# Patient Record
Sex: FEMALE | Race: WHITE | NOT HISPANIC OR LATINO | Employment: FULL TIME | URBAN - METROPOLITAN AREA
[De-identification: names, ages, dates, MRNs, and addresses within clinical notes are randomized per-mention and may not be internally consistent; named-entity substitution may affect disease eponyms.]

---

## 2017-03-20 ENCOUNTER — OFFICE VISIT - HEALTHEAST (OUTPATIENT)
Dept: FAMILY MEDICINE | Facility: CLINIC | Age: 37
End: 2017-03-20

## 2017-03-20 DIAGNOSIS — H10.9 CONJUNCTIVITIS: ICD-10-CM

## 2017-05-15 ENCOUNTER — COMMUNICATION - HEALTHEAST (OUTPATIENT)
Dept: SCHEDULING | Facility: CLINIC | Age: 37
End: 2017-05-15

## 2017-05-15 ENCOUNTER — OFFICE VISIT - HEALTHEAST (OUTPATIENT)
Dept: FAMILY MEDICINE | Facility: CLINIC | Age: 37
End: 2017-05-15

## 2017-05-15 DIAGNOSIS — M54.9 BACK PAIN: ICD-10-CM

## 2017-08-28 ENCOUNTER — OFFICE VISIT - HEALTHEAST (OUTPATIENT)
Dept: FAMILY MEDICINE | Facility: CLINIC | Age: 37
End: 2017-08-28

## 2017-08-28 ENCOUNTER — COMMUNICATION - HEALTHEAST (OUTPATIENT)
Dept: TELEHEALTH | Facility: CLINIC | Age: 37
End: 2017-08-28

## 2017-08-28 DIAGNOSIS — R07.0 THROAT DISCOMFORT: ICD-10-CM

## 2017-08-28 DIAGNOSIS — R53.83 FATIGUE: ICD-10-CM

## 2017-08-29 ENCOUNTER — HOSPITAL ENCOUNTER (OUTPATIENT)
Dept: ULTRASOUND IMAGING | Facility: CLINIC | Age: 37
Discharge: HOME | End: 2017-08-29
Attending: FAMILY MEDICINE

## 2017-08-29 DIAGNOSIS — R07.0 THROAT DISCOMFORT: ICD-10-CM

## 2017-08-30 ENCOUNTER — COMMUNICATION - HEALTHEAST (OUTPATIENT)
Dept: FAMILY MEDICINE | Facility: CLINIC | Age: 37
End: 2017-08-30

## 2017-09-13 ENCOUNTER — COMMUNICATION - HEALTHEAST (OUTPATIENT)
Dept: FAMILY MEDICINE | Facility: CLINIC | Age: 37
End: 2017-09-13

## 2017-09-13 DIAGNOSIS — R07.0 THROAT DISCOMFORT: ICD-10-CM

## 2017-09-13 DIAGNOSIS — R49.0 HOARSENESS: ICD-10-CM

## 2017-09-19 ENCOUNTER — COMMUNICATION - HEALTHEAST (OUTPATIENT)
Dept: FAMILY MEDICINE | Facility: CLINIC | Age: 37
End: 2017-09-19

## 2017-10-12 ENCOUNTER — OFFICE VISIT - HEALTHEAST (OUTPATIENT)
Dept: FAMILY MEDICINE | Facility: CLINIC | Age: 37
End: 2017-10-12

## 2017-10-12 ENCOUNTER — OFFICE VISIT - HEALTHEAST (OUTPATIENT)
Dept: OTOLARYNGOLOGY | Facility: CLINIC | Age: 37
End: 2017-10-12

## 2017-10-12 DIAGNOSIS — Z12.4 CERVICAL CANCER SCREENING: ICD-10-CM

## 2017-10-12 DIAGNOSIS — F39 MOOD DISORDER (H): ICD-10-CM

## 2017-10-12 DIAGNOSIS — F41.9 ANXIETY: ICD-10-CM

## 2017-10-12 DIAGNOSIS — E04.1 THYROID NODULE: ICD-10-CM

## 2017-10-12 DIAGNOSIS — Z00.00 ROUTINE GENERAL MEDICAL EXAMINATION AT A HEALTH CARE FACILITY: ICD-10-CM

## 2017-10-12 DIAGNOSIS — Z13.220 SCREENING CHOLESTEROL LEVEL: ICD-10-CM

## 2017-10-12 DIAGNOSIS — K21.9 LARYNGOPHARYNGEAL REFLUX (LPR): ICD-10-CM

## 2017-10-12 DIAGNOSIS — Z13.1 DIABETES MELLITUS SCREENING: ICD-10-CM

## 2017-10-12 DIAGNOSIS — J37.0 CHRONIC LARYNGITIS: ICD-10-CM

## 2017-10-12 LAB
CHOLEST SERPL-MCNC: 198 MG/DL
FASTING STATUS PATIENT QL REPORTED: YES
HDLC SERPL-MCNC: 52 MG/DL
LDLC SERPL CALC-MCNC: 122 MG/DL
TRIGL SERPL-MCNC: 121 MG/DL

## 2017-10-12 ASSESSMENT — MIFFLIN-ST. JEOR: SCORE: 1244.3

## 2017-10-17 LAB
BKR LAB AP ABNORMAL BLEEDING: NO
BKR LAB AP BIRTH CONTROL/HORMONES: NORMAL
BKR LAB AP CERVICAL APPEARANCE: NORMAL
BKR LAB AP GYN ADEQUACY: NORMAL
BKR LAB AP GYN INTERPRETATION: NORMAL
BKR LAB AP HPV REFLEX: NORMAL
BKR LAB AP LMP: NORMAL
BKR LAB AP PATIENT STATUS: NORMAL
BKR LAB AP PREVIOUS ABNORMAL: NORMAL
BKR LAB AP PREVIOUS NORMAL: 2012
HIGH RISK?: NO
HPV INTERPRETATION - HISTORICAL: NORMAL
HPV INTERPRETER - HISTORICAL: NORMAL
PATH REPORT.COMMENTS IMP SPEC: NORMAL
RESULT FLAG (HE HISTORICAL CONVERSION): NORMAL

## 2017-10-18 ENCOUNTER — COMMUNICATION - HEALTHEAST (OUTPATIENT)
Dept: FAMILY MEDICINE | Facility: CLINIC | Age: 37
End: 2017-10-18

## 2017-11-13 ENCOUNTER — OFFICE VISIT - HEALTHEAST (OUTPATIENT)
Dept: FAMILY MEDICINE | Facility: CLINIC | Age: 37
End: 2017-11-13

## 2017-11-13 DIAGNOSIS — F41.9 ANXIETY: ICD-10-CM

## 2018-01-16 ENCOUNTER — COMMUNICATION - HEALTHEAST (OUTPATIENT)
Dept: ADMINISTRATIVE | Facility: CLINIC | Age: 38
End: 2018-01-16

## 2018-09-25 ENCOUNTER — RECORDS - HEALTHEAST (OUTPATIENT)
Dept: ADMINISTRATIVE | Facility: OTHER | Age: 38
End: 2018-09-25

## 2018-10-30 ENCOUNTER — OFFICE VISIT - HEALTHEAST (OUTPATIENT)
Dept: FAMILY MEDICINE | Facility: CLINIC | Age: 38
End: 2018-10-30

## 2018-10-30 DIAGNOSIS — F41.9 ANXIETY: ICD-10-CM

## 2018-10-30 DIAGNOSIS — Z00.00 ROUTINE GENERAL MEDICAL EXAMINATION AT A HEALTH CARE FACILITY: ICD-10-CM

## 2018-10-30 DIAGNOSIS — R35.0 INCREASED FREQUENCY OF URINATION: ICD-10-CM

## 2018-10-30 DIAGNOSIS — E04.1 THYROID NODULE: ICD-10-CM

## 2018-10-30 LAB
ALBUMIN UR-MCNC: NEGATIVE MG/DL
APPEARANCE UR: CLEAR
BACTERIA #/AREA URNS HPF: ABNORMAL HPF
BILIRUB UR QL STRIP: NEGATIVE
COLOR UR AUTO: YELLOW
GLUCOSE UR STRIP-MCNC: NEGATIVE MG/DL
HGB UR QL STRIP: ABNORMAL
KETONES UR STRIP-MCNC: NEGATIVE MG/DL
LEUKOCYTE ESTERASE UR QL STRIP: NEGATIVE
NITRATE UR QL: NEGATIVE
PH UR STRIP: 7 [PH] (ref 5–8)
RBC #/AREA URNS AUTO: ABNORMAL HPF
SP GR UR STRIP: 1.01 (ref 1–1.03)
SQUAMOUS #/AREA URNS AUTO: ABNORMAL LPF
TSH SERPL DL<=0.005 MIU/L-ACNC: 1.8 UIU/ML (ref 0.3–5)
UROBILINOGEN UR STRIP-ACNC: ABNORMAL
WBC #/AREA URNS AUTO: ABNORMAL HPF

## 2018-10-30 ASSESSMENT — MIFFLIN-ST. JEOR: SCORE: 1238.34

## 2018-10-31 LAB — BACTERIA SPEC CULT: NO GROWTH

## 2018-11-01 ENCOUNTER — COMMUNICATION - HEALTHEAST (OUTPATIENT)
Dept: FAMILY MEDICINE | Facility: CLINIC | Age: 38
End: 2018-11-01

## 2018-11-12 ENCOUNTER — HOSPITAL ENCOUNTER (OUTPATIENT)
Dept: ULTRASOUND IMAGING | Facility: CLINIC | Age: 38
Discharge: HOME | End: 2018-11-12
Attending: FAMILY MEDICINE

## 2018-11-12 DIAGNOSIS — E04.1 THYROID NODULE: ICD-10-CM

## 2018-11-15 ENCOUNTER — COMMUNICATION - HEALTHEAST (OUTPATIENT)
Dept: FAMILY MEDICINE | Facility: CLINIC | Age: 38
End: 2018-11-15

## 2018-11-26 ENCOUNTER — COMMUNICATION - HEALTHEAST (OUTPATIENT)
Dept: FAMILY MEDICINE | Facility: CLINIC | Age: 38
End: 2018-11-26

## 2018-11-26 ENCOUNTER — AMBULATORY - HEALTHEAST (OUTPATIENT)
Dept: FAMILY MEDICINE | Facility: CLINIC | Age: 38
End: 2018-11-26

## 2018-11-26 DIAGNOSIS — L98.9 SKIN LESION: ICD-10-CM

## 2018-11-26 DIAGNOSIS — F41.9 ANXIETY: ICD-10-CM

## 2018-11-26 DIAGNOSIS — E04.1 THYROID NODULE: ICD-10-CM

## 2018-11-29 ENCOUNTER — AMBULATORY - HEALTHEAST (OUTPATIENT)
Dept: FAMILY MEDICINE | Facility: CLINIC | Age: 38
End: 2018-11-29

## 2018-11-29 DIAGNOSIS — E04.1 THYROID NODULE: ICD-10-CM

## 2018-12-03 ENCOUNTER — HOSPITAL ENCOUNTER (OUTPATIENT)
Dept: ULTRASOUND IMAGING | Facility: CLINIC | Age: 38
Discharge: HOME | End: 2018-12-03
Attending: FAMILY MEDICINE | Admitting: RADIOLOGY

## 2018-12-03 DIAGNOSIS — E04.1 THYROID NODULE: ICD-10-CM

## 2018-12-05 LAB
LAB AP CHARGES (HE HISTORICAL CONVERSION): NORMAL
LAB AP INITIAL CYTO EVAL (HE HISTORICAL CONVERSION): NORMAL
LAB MED GENERAL PATH INTERP (HE HISTORICAL CONVERSION): NORMAL
PATH REPORT.COMMENTS IMP SPEC: NORMAL
PATH REPORT.COMMENTS IMP SPEC: NORMAL
PATH REPORT.FINAL DX SPEC: NORMAL
PATH REPORT.MICROSCOPIC SPEC OTHER STN: NORMAL
PATH REPORT.RELEVANT HX SPEC: NORMAL
RESULT FLAG (HE HISTORICAL CONVERSION): NORMAL
SPECIMEN DESCRIPTION: NORMAL

## 2018-12-06 ENCOUNTER — COMMUNICATION - HEALTHEAST (OUTPATIENT)
Dept: FAMILY MEDICINE | Facility: CLINIC | Age: 38
End: 2018-12-06

## 2019-02-12 ENCOUNTER — RECORDS - HEALTHEAST (OUTPATIENT)
Dept: ADMINISTRATIVE | Facility: OTHER | Age: 39
End: 2019-02-12

## 2019-02-25 ENCOUNTER — COMMUNICATION - HEALTHEAST (OUTPATIENT)
Dept: SCHEDULING | Facility: CLINIC | Age: 39
End: 2019-02-25

## 2019-02-25 ENCOUNTER — COMMUNICATION - HEALTHEAST (OUTPATIENT)
Dept: TELEHEALTH | Facility: CLINIC | Age: 39
End: 2019-02-25

## 2019-02-25 ENCOUNTER — OFFICE VISIT - HEALTHEAST (OUTPATIENT)
Dept: FAMILY MEDICINE | Facility: CLINIC | Age: 39
End: 2019-02-25

## 2019-02-25 DIAGNOSIS — R35.0 URINARY FREQUENCY: ICD-10-CM

## 2019-02-25 DIAGNOSIS — J06.9 VIRAL UPPER RESPIRATORY TRACT INFECTION: ICD-10-CM

## 2019-02-25 LAB
ALBUMIN UR-MCNC: NEGATIVE MG/DL
APPEARANCE UR: CLEAR
BACTERIA #/AREA URNS HPF: ABNORMAL HPF
BILIRUB UR QL STRIP: NEGATIVE
COLOR UR AUTO: YELLOW
GLUCOSE UR STRIP-MCNC: NEGATIVE MG/DL
HGB UR QL STRIP: ABNORMAL
KETONES UR STRIP-MCNC: NEGATIVE MG/DL
LEUKOCYTE ESTERASE UR QL STRIP: NEGATIVE
NITRATE UR QL: NEGATIVE
PH UR STRIP: 6 [PH] (ref 5–8)
RBC #/AREA URNS AUTO: ABNORMAL HPF
SP GR UR STRIP: 1.02 (ref 1–1.03)
SQUAMOUS #/AREA URNS AUTO: ABNORMAL LPF
UROBILINOGEN UR STRIP-ACNC: ABNORMAL
WBC #/AREA URNS AUTO: ABNORMAL HPF

## 2019-02-27 ENCOUNTER — COMMUNICATION - HEALTHEAST (OUTPATIENT)
Dept: SCHEDULING | Facility: CLINIC | Age: 39
End: 2019-02-27

## 2019-02-28 ENCOUNTER — AMBULATORY - HEALTHEAST (OUTPATIENT)
Dept: FAMILY MEDICINE | Facility: CLINIC | Age: 39
End: 2019-02-28

## 2019-02-28 DIAGNOSIS — N39.0 URINARY TRACT INFECTION WITHOUT HEMATURIA, SITE UNSPECIFIED: ICD-10-CM

## 2019-02-28 LAB — BACTERIA SPEC CULT: ABNORMAL

## 2019-03-18 ENCOUNTER — OFFICE VISIT - HEALTHEAST (OUTPATIENT)
Dept: FAMILY MEDICINE | Facility: CLINIC | Age: 39
End: 2019-03-18

## 2019-03-18 DIAGNOSIS — R35.0 URINARY FREQUENCY: ICD-10-CM

## 2019-03-18 DIAGNOSIS — R09.82 POST-NASAL DRIP: ICD-10-CM

## 2019-03-18 LAB
ALBUMIN UR-MCNC: NEGATIVE MG/DL
APPEARANCE UR: CLEAR
BILIRUB UR QL STRIP: NEGATIVE
COLOR UR AUTO: YELLOW
GLUCOSE UR STRIP-MCNC: NEGATIVE MG/DL
HGB UR QL STRIP: NEGATIVE
KETONES UR STRIP-MCNC: NEGATIVE MG/DL
LEUKOCYTE ESTERASE UR QL STRIP: NEGATIVE
NITRATE UR QL: NEGATIVE
PH UR STRIP: 6.5 [PH] (ref 5–8)
SP GR UR STRIP: 1.01 (ref 1–1.03)
UROBILINOGEN UR STRIP-ACNC: NORMAL

## 2019-03-19 LAB — BACTERIA SPEC CULT: NO GROWTH

## 2019-05-14 ENCOUNTER — COMMUNICATION - HEALTHEAST (OUTPATIENT)
Dept: FAMILY MEDICINE | Facility: CLINIC | Age: 39
End: 2019-05-14

## 2019-05-16 ENCOUNTER — OFFICE VISIT - HEALTHEAST (OUTPATIENT)
Dept: FAMILY MEDICINE | Facility: CLINIC | Age: 39
End: 2019-05-16

## 2019-05-16 DIAGNOSIS — R35.0 INCREASED FREQUENCY OF URINATION: ICD-10-CM

## 2019-05-16 LAB
ALBUMIN UR-MCNC: NEGATIVE MG/DL
APPEARANCE UR: CLEAR
BACTERIA #/AREA URNS HPF: ABNORMAL HPF
BILIRUB UR QL STRIP: NEGATIVE
COLOR UR AUTO: YELLOW
GLUCOSE UR STRIP-MCNC: NEGATIVE MG/DL
HGB UR QL STRIP: ABNORMAL
KETONES UR STRIP-MCNC: ABNORMAL MG/DL
LEUKOCYTE ESTERASE UR QL STRIP: NEGATIVE
NITRATE UR QL: NEGATIVE
PH UR STRIP: 7 [PH] (ref 5–8)
RBC #/AREA URNS AUTO: ABNORMAL HPF
SP GR UR STRIP: 1.02 (ref 1–1.03)
SQUAMOUS #/AREA URNS AUTO: ABNORMAL LPF
UROBILINOGEN UR STRIP-ACNC: ABNORMAL
WBC #/AREA URNS AUTO: ABNORMAL HPF

## 2019-05-17 LAB — BACTERIA SPEC CULT: NO GROWTH

## 2019-07-01 ENCOUNTER — RECORDS - HEALTHEAST (OUTPATIENT)
Dept: ADMINISTRATIVE | Facility: OTHER | Age: 39
End: 2019-07-01

## 2019-11-18 ENCOUNTER — OFFICE VISIT - HEALTHEAST (OUTPATIENT)
Dept: FAMILY MEDICINE | Facility: CLINIC | Age: 39
End: 2019-11-18

## 2019-11-18 ENCOUNTER — COMMUNICATION - HEALTHEAST (OUTPATIENT)
Dept: TELEHEALTH | Facility: CLINIC | Age: 39
End: 2019-11-18

## 2019-11-18 DIAGNOSIS — R35.0 URINARY FREQUENCY: ICD-10-CM

## 2019-11-18 LAB
ALBUMIN UR-MCNC: NEGATIVE MG/DL
APPEARANCE UR: CLEAR
BACTERIA #/AREA URNS HPF: ABNORMAL HPF
BILIRUB UR QL STRIP: NEGATIVE
COLOR UR AUTO: YELLOW
GLUCOSE UR STRIP-MCNC: NEGATIVE MG/DL
HGB UR QL STRIP: ABNORMAL
KETONES UR STRIP-MCNC: NEGATIVE MG/DL
LEUKOCYTE ESTERASE UR QL STRIP: NEGATIVE
NITRATE UR QL: NEGATIVE
PH UR STRIP: 7 [PH] (ref 5–8)
RBC #/AREA URNS AUTO: ABNORMAL HPF
SP GR UR STRIP: 1.01 (ref 1–1.03)
SQUAMOUS #/AREA URNS AUTO: ABNORMAL LPF
UROBILINOGEN UR STRIP-ACNC: ABNORMAL
WBC #/AREA URNS AUTO: ABNORMAL HPF

## 2019-11-18 ASSESSMENT — MIFFLIN-ST. JEOR: SCORE: 1254.22

## 2019-11-19 LAB — BACTERIA SPEC CULT: NO GROWTH

## 2019-12-09 ENCOUNTER — OFFICE VISIT - HEALTHEAST (OUTPATIENT)
Dept: FAMILY MEDICINE | Facility: CLINIC | Age: 39
End: 2019-12-09

## 2019-12-09 DIAGNOSIS — Z00.00 ROUTINE GENERAL MEDICAL EXAMINATION AT A HEALTH CARE FACILITY: ICD-10-CM

## 2019-12-09 DIAGNOSIS — G47.00 INSOMNIA, UNSPECIFIED TYPE: ICD-10-CM

## 2019-12-09 DIAGNOSIS — F41.9 ANXIETY: ICD-10-CM

## 2019-12-09 LAB
CHOLEST SERPL-MCNC: 155 MG/DL
FASTING STATUS PATIENT QL REPORTED: YES
FASTING STATUS PATIENT QL REPORTED: YES
GLUCOSE BLD-MCNC: 84 MG/DL (ref 70–99)
HDLC SERPL-MCNC: 46 MG/DL
LDLC SERPL CALC-MCNC: 100 MG/DL
TRIGL SERPL-MCNC: 46 MG/DL
TSH SERPL DL<=0.005 MIU/L-ACNC: 1.5 UIU/ML (ref 0.3–5)

## 2019-12-09 RX ORDER — HYDROXYZINE HYDROCHLORIDE 50 MG/1
TABLET, FILM COATED ORAL
Qty: 90 TABLET | Refills: 3 | Status: SHIPPED | OUTPATIENT
Start: 2019-12-09 | End: 2022-08-08

## 2019-12-09 ASSESSMENT — MIFFLIN-ST. JEOR: SCORE: 1235.22

## 2020-03-15 ENCOUNTER — VIRTUAL VISIT (OUTPATIENT)
Dept: FAMILY MEDICINE | Facility: OTHER | Age: 40
End: 2020-03-15

## 2020-03-15 ENCOUNTER — OFFICE VISIT - HEALTHEAST (OUTPATIENT)
Dept: FAMILY MEDICINE | Facility: CLINIC | Age: 40
End: 2020-03-15

## 2020-03-15 DIAGNOSIS — R05.9 COUGH: ICD-10-CM

## 2020-03-15 NOTE — PROGRESS NOTES
"Date: 03/15/2020 11:27:00  Clinician: Norma Balir  Clinician NPI: [id number]  Patient: Viv Rubio  Patient : 1980  Patient Address: [facility] [place] [address]  Patient Phone: [phone]  Visit Protocol: URI  Patient Summary:  Viv is a 39 year old ( : 1980 ) female who initiated a Visit for COVID-19 (Coronavirus) evaluation and screening. When asked the question \"Please sign me up to receive news, health information and promotions. \", Viv responded \"No\".    Viv states her symptoms started today.   Her symptoms consist of malaise, a headache, enlarged lymph nodes, myalgia, and a cough. She is experiencing mild difficulty breathing with activities but can speak normally in full sentences.   Symptom details     Cough: Viv coughs a few times an hour and her cough is not more bothersome at night. Phlegm comes into her throat when she coughs. She does not believe her cough is caused by post-nasal drip. The color of the phlegm is clear.     Headache: She states the headache is mild (1-3 on a 10 point pain scale).      Viv denies having ear pain, fever, rhinitis, facial pain or pressure, chills, wheezing, sore throat, nasal congestion, and teeth pain. She also denies having recent facial or sinus surgery in the past 60 days and taking antibiotic medication for the symptoms.   Precipitating events  She has not recently been exposed to someone with influenza. Viv has not been in close contact with any high risk individuals.   Pertinent COVID-19 (Coronavirus) information  Viv has not traveled internationally or to the areas where COVID-19 (Coronavirus) is widespread in the last 14 days before the start of her symptoms.   Viv has not had close contact with a suspected or laboratory-confirmed COVID-19 patient within 14 days of symptom onset.   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   Pertinent medical history  Viv does not get yeast " infections when she takes antibiotics.   Viv does not need a return to work/school note.   Weight: 130 lbs   Viv does not smoke or use smokeless tobacco.   She denies pregnancy and denies breastfeeding. She does not menstruate.   Additional information as reported by the patient (free text): Was in [place] for past week, was at [employer] etc, flew home yesterday,  experiencing similar symptoms but also has sore throat.  Just trying to determine if I can return to work on Tuesday as planned or if I need to quarantine   Weight: 130 lbs    MEDICATIONS: hydroxyzine HCl oral, ALLERGIES: NKDA  Clinician Response:  Dear Viv,   Dear Viv Rubio,  Based on the information you have provided, it is recommended that you go to one of our designated Corona Virus 19 testing centers to get a test done from your car. To do this follow these instructions:  You should go to one of our dedicated testing centers as soon as possible during the hours below at one of these locations:   Walk-in Care: [facility] at [address] [address]. Hours: M-F 7am - 6pm, Sat-Sun 8am -- 3pm   [facility] at [address] [address]. Hours: Every Day 9am -- 7pm  Walk-in Care: [facility] at [address] [place] [address]. Hours: M-F 7am - 6pm, Sat-Sun 8am -- 3pm  M [address] [address]. Hours: M-F 11am -- 7pm, Sat-Sun 9am-4pm   What to expect:   When you arrive please come park in the parking lot.  Call [phone] and let them know which of the four clinics you are at, description of your car and where you are parked. Mention you did an [facility] visit and were sent for testing.  They will add you to the queue to get your test (you will stay in your car the entire time).  On that phone call you will give them the information to register your for the visit.  You will then be met by a provider  who will perform a brief assessment in your car and collect samples to send for Corona Virus 19, influenza and possibly RSV.  You will be given patient information about respiratory illnesses and instructions. You with the results if you are not on mychart.   Isolate Yourself:   Isolate yourself while traveling.  Do Not allow any visitors within 6 feet.  Do Not go to work or school.  Do Not go to [employer],  centers, shopping, or other public places.  Do Not shake hands.  Avoid close contact with others (hugging, kissing).  Protect Others:  Cover Your Mouth and Nose with a mask, disposable tissue or wash cloth to avoid spreading germs to others.  Wash your hands and face frequently with soap and water   Fever Medicines:   For fever relief, take acetaminophen or ibuprofen.  Treat fevers above 101deg F (38.3deg C) to lower fevers and make you more comfortable.   Acetaminophen (e.g., Tylenol): Take 650 mg (two 325 mg pills) by mouth every 4-6 hours as needed of regular strength Tylenol or 1,000 mg (two 500 mg pills) every 8 hours as needed of Extra Strength Tylenol.   Ibuprofen (e.g., Motrin, Advil): Take 400 mg (two 200 mg pills) by mouth every 6 hours as needed.   Acetaminophen is thought to be safer than ibuprofen or naproxen for people over 65 years old. Acetaminophen is in many OTC and prescription medicines. It might be in more than one medicine that you are taking. You need to be careful and not take an overdose. Before taking any medicine, read all the instructions on the package.  Caution -NSAIDs (e.g., ibuprofen, naproxen): Do not take nonsteroidal anti-inflammatory drugs (NSAIDs) if you have stomach problems, kidney disease, heart failure, or other contraindications to using this type of medicine. Do not take NSAID medicines for over 7 days without consulting your PCP. Do not take NSAID medicines if you are pregnant. Do not take NSAID medicines if you are also taking blood thinners.   Call Back If:  Breathing difficulty develops or you become worse.  Thank you for limiting contact with others, wearing a simple mask to cover your cough, practice good hand hygiene habits and accessing our virtual services where possible to limit the spread of this virus.  For more information about COVID19 and options for caring for yourself at home, please visit the CDC website at https://www.cdc.gov/coronavirus/2019-ncov/about/steps-when-sick.html  For more options for care at [facility], please visit our website at [email or web address]    Diagnosis: Cough  Diagnosis ICD: R05

## 2020-03-19 ENCOUNTER — NURSE TRIAGE (OUTPATIENT)
Dept: NURSING | Facility: CLINIC | Age: 40
End: 2020-03-19

## 2020-03-19 NOTE — TELEPHONE ENCOUNTER
Was tested 3-15-20 for Covid19 test and asking if they are still running it?  Informed patient that they are still running the tests that were swabbed but may take a little longer, and will call her with results.  She should continue her quarantine until test results and symptoms are gone.    Instructions for Patients    Thank you for limiting contact with others, wearing a simple mask to cover your cough, practice good hand hygiene habits and accessing our virtual services where possible to limit the spread of this virus.    For more information about COVID19 and options for caring for yourself at home, please visit the CDC website at https://www.cdc.gov/coronavirus/2019-ncov/about/steps-when-sick.html  For more options for care at [facility], please visit our website at [email or web address]       Cat Lock RN  [facility]         Reason for Disposition    Health Information question, no triage required and triager able to answer question    Protocols used: INFORMATION ONLY CALL-A-AH

## 2020-03-20 ENCOUNTER — COMMUNICATION - HEALTHEAST (OUTPATIENT)
Dept: FAMILY MEDICINE | Facility: CLINIC | Age: 40
End: 2020-03-20

## 2020-03-20 LAB
COVID-19 VIRUS PCR RESULT FROM MDH: NEGATIVE
SPECIMEN STATUS: NORMAL

## 2020-03-21 ENCOUNTER — COMMUNICATION - HEALTHEAST (OUTPATIENT)
Dept: FAMILY MEDICINE | Facility: CLINIC | Age: 40
End: 2020-03-21

## 2020-05-11 ENCOUNTER — VIRTUAL VISIT (OUTPATIENT)
Dept: FAMILY MEDICINE | Facility: OTHER | Age: 40
End: 2020-05-11

## 2020-05-11 ENCOUNTER — COMMUNICATION - HEALTHEAST (OUTPATIENT)
Dept: SCHEDULING | Facility: CLINIC | Age: 40
End: 2020-05-11

## 2020-05-11 NOTE — PROGRESS NOTES
"Date: 2020 07:14:42  Clinician: Sandra Azar  Clinician NPI: [id number]  Patient: Viv Rubio  Patient : 1980  Patient Address: [address] [place] [address]  Patient Phone: [phone]  Visit Protocol: UTI  Patient Summary:  Viv is a 39 year old ( : 1980 ) female who initiated a Visit for a presumed bladder infection. When asked the question \"Please sign me up to receive news, health information and promotions. \", Viv responded \"No\".   Her symptoms started 1-3 days ago and consist of dysuria, foul-smelling urine, nausea, urinary frequency, chills, vaginal discharge, and urgency. Viv also feels feverish but was unable to measure her temperature.   Symptom details     Urine color: The color of her urine is yellow.     Vaginal discharge: The discharge is white in color and is smooth. The discharge is typical for her.      Denied symptoms include feeling as if the bladder is never empty, flank pain, abdominal pain, urinary incontinence, vomiting, and vaginal itching.   Over-the-counter medications or home remedies used to relieve the current symptoms as reported by the patient (free text): Tylenol   Precipitating events  Viv denies having a sexually transmitted disease.  Pertinent medical history  Viv has had a bladder infection before but has not had any in the past 12 months. Her current symptoms are similar to her previous bladder infection symptoms.   She is not sure what antibiotics have been effective in treating her past bladder infections.   She has a history of kidney stones. Her last episode was more than 6 months ago.   Viv has not been prescribed antibiotics to prevent frequent or repeated bladder infections in the past and does not get yeast infections when she takes antibiotics. She has not experienced problems or side effects with any of the common antibiotics used to treat bladder infections.   She has not used a catheter or been a patient in a " hospital or nursing home in the past 2 weeks.   Viv does not smoke or use smokeless tobacco.   She denies pregnancy and denies breastfeeding. She does not menstruate.   Additional information as reported by the patient (free text): Slight leaking,discharge,odor for past e weeks.  Around 2:30am this morning intense urge,nausea,chills began, generally achy and headache.  Able to empty bladder with pain, cloudy,smiley, pink tinge when wiped.  Attempted to lay back down but constantly had intense I urge to go but only a few drops would come out.  Took Tylenol around 4am which helped a little and was able to fall back asleep for another hour.  Spoke to call line nurse who advised to do this.     MEDICATIONS: hydroxyzine HCl oral, ALLERGIES: NKDA  Clinician Response:  Dear Viv,  Based on the information you have provided, you likely have an acute urinary tract infection, also called a bladder infection. Bladder infections occur when bacteria from the outside of the body enters the urinary tract. Any part of the urinary system can be infected, but the bladder is the most common.  Medication information  I am prescribing:     Sulfamethoxazole-trimethoprim (Bactrim DS) 800-160 mg oral tablet. Take 1 tablet by mouth every 12 hours for 3 days. There are no refills with this prescription.   Certain antibiotics such as Bactrim and Ciprofloxacin can cause your skin to be more sensitive to the sun. Exposure to the sun when taking these antibiotics may cause skin rash, itching, redness, or a severe sunburn. Be sure to avoid prolonged or direct exposure to the sun, especially between 10 am and 3 pm, if possible. Wear protective clothing and use sunscreen of SPF 30 or higher when you are outdoors.  The medication I prescribed for your bladder infection is an antibiotic. Continue taking the medication until it is gone even if you feel better. If you get an upset stomach while taking antibiotics, taking the medication with food  can help.   Yeast infections can be a common side effect of antibiotics. The most common symptom of a yeast infection is itchiness in and around the vagina. Other signs and symptoms include burning, redness, or a thick, white vaginal discharge that looks like cottage cheese and does not have a bad smell.  Unless you are allergic to the following over-the-counter medication, I recommend:     Phenazopyridine (AZO, Uristat, or store brand) oral tablet to treat your discomfort with urination. Swallow 2 tablets 3 times a day for up to 2 days. Take the tablets with a full glass of water after a meal.   This medication helps to relieve symptoms caused by irritation of urinary tract such as pain, burning, and the sudden urge to urinate, but will not cure a bladder infection. The color of your urine will likely turn an orange color and can permanently stain clothing it comes into contact with.  Soft contact lenses can also be permanently stained and should not be worn while taking this medication. If you must wear your contacts, wash your hands after handling the medication.  Stop using this medication immediately and be seen in a clinic or urgent care if your skin or the whites of your eyes appear yellowish in color.  Over-the-counter medications do not require a prescription. Ask the pharmacist if you have any questions.  Self care  Urination helps to flush bacteria from the urinary tract. For this reason, drinking water and urinating often helps relieve some urinary symptoms and can decrease your risk of getting bladder infections in the future.  Other steps you can take to prevent future bladder infections include:     Wipe front to back after using the bathroom    Urinate after sexual intercourse    Avoid using deodorant sprays, douches, or powders in the vaginal area     When to seek care  Please make an appointment to be seen in a clinic or urgent care if any of the following occur:     You develop new symptoms or  your symptoms become worse    You have medication side effects that make it difficult to take them as prescribed    Your symptoms do not improve within 1-2 days of starting treatment    You have symptoms of a bladder infection that return shortly after completing treatment     It is possible to have an allergic reaction to an antibiotic even if you have not had one in the past. If you notice a new rash, significant swelling, or difficulty breathing, stop taking this medication immediately and go to a clinic or urgent care.   Diagnosis: Acute uncomplicated bladder infection  Diagnosis ICD: N39.0  Prescription: sulfamethoxazole-trimethoprim (Bactrim DS) 800-160 mg oral tablet 6 tablet, 3 days supply. Take 1 tablet by mouth every 12 hours for 3 days. Refills: 0, Refill as needed: no, Allow substitutions: yes  Pharmacy: [id number] - [phone] - [address] [place] [address]

## 2020-09-26 ENCOUNTER — AMBULATORY - HEALTHEAST (OUTPATIENT)
Dept: NURSING | Facility: CLINIC | Age: 40
End: 2020-09-26

## 2020-10-20 ENCOUNTER — COMMUNICATION - HEALTHEAST (OUTPATIENT)
Dept: FAMILY MEDICINE | Facility: CLINIC | Age: 40
End: 2020-10-20

## 2021-01-13 ENCOUNTER — AMBULATORY - HEALTHEAST (OUTPATIENT)
Dept: FAMILY MEDICINE | Facility: CLINIC | Age: 41
End: 2021-01-13

## 2021-01-13 ENCOUNTER — OFFICE VISIT - HEALTHEAST (OUTPATIENT)
Dept: FAMILY MEDICINE | Facility: CLINIC | Age: 41
End: 2021-01-13

## 2021-01-13 ENCOUNTER — AMBULATORY - HEALTHEAST (OUTPATIENT)
Dept: LAB | Facility: CLINIC | Age: 41
End: 2021-01-13

## 2021-01-13 DIAGNOSIS — B37.31 YEAST INFECTION OF THE VAGINA: ICD-10-CM

## 2021-01-13 DIAGNOSIS — J02.9 SORE THROAT: ICD-10-CM

## 2021-01-13 DIAGNOSIS — R30.0 DYSURIA: ICD-10-CM

## 2021-01-13 LAB
ALBUMIN UR-MCNC: NEGATIVE MG/DL
APPEARANCE UR: CLEAR
BACTERIA #/AREA URNS HPF: ABNORMAL HPF
BILIRUB UR QL STRIP: NEGATIVE
CLUE CELLS: ABNORMAL
COLOR UR AUTO: YELLOW
DEPRECATED S PYO AG THROAT QL EIA: NORMAL
GLUCOSE UR STRIP-MCNC: NEGATIVE MG/DL
GROUP A STREP BY PCR: NORMAL
HGB UR QL STRIP: ABNORMAL
KETONES UR STRIP-MCNC: NEGATIVE MG/DL
LEUKOCYTE ESTERASE UR QL STRIP: NEGATIVE
NITRATE UR QL: NEGATIVE
PH UR STRIP: 6.5 [PH] (ref 5–8)
RBC #/AREA URNS AUTO: ABNORMAL HPF
SP GR UR STRIP: 1.01 (ref 1–1.03)
SQUAMOUS #/AREA URNS AUTO: ABNORMAL LPF
TRICHOMONAS, WET PREP: ABNORMAL
UROBILINOGEN UR STRIP-ACNC: ABNORMAL
WBC #/AREA URNS AUTO: ABNORMAL HPF
YEAST, WET PREP: ABNORMAL

## 2021-01-14 LAB — BACTERIA SPEC CULT: NO GROWTH

## 2021-03-15 ENCOUNTER — COMMUNICATION - HEALTHEAST (OUTPATIENT)
Dept: FAMILY MEDICINE | Facility: CLINIC | Age: 41
End: 2021-03-15

## 2021-03-17 ENCOUNTER — OFFICE VISIT - HEALTHEAST (OUTPATIENT)
Dept: FAMILY MEDICINE | Facility: CLINIC | Age: 41
End: 2021-03-17

## 2021-03-17 DIAGNOSIS — N89.8 VAGINAL ITCHING: ICD-10-CM

## 2021-03-17 LAB
CLUE CELLS: NORMAL
TRICHOMONAS, WET PREP: NORMAL
YEAST, WET PREP: NORMAL

## 2021-05-28 NOTE — TELEPHONE ENCOUNTER
"Describe your symptoms: increased urgency and frequency, mild dysuria, tenderness across pelvis (suprapubic).  Patient denies hematuria, no change in urine clarity, denies back or flank pain. Felt possible fever but did not take her temperature. Increased fatigue over the weekend.   Any pain: yes - suprapubic tenderness rating about a 3/10, \"constantly\" there and uncomformtable but not \"intense\"  New/Ongoing: New  How long have you been having symptoms: 5  day(s)  Have you been seen for this:  No.  Appointment offered? Patient declines - Ongoing problem and would like to talk with hte provider. Last UTI was in March and felt better after Augmentin.  Triage offered?: patient declined  Home remedies tried:  pushing fluids  Pharmacy Name and Location: [facility] in [place] on [address]  Okay to leave a detailed message? Yes    "

## 2021-05-28 NOTE — PROGRESS NOTES
ASSESSMENT/PLAN:  Increased frequency of urination  38-year-old female with a few days of urinary frequency, urgency, dysuria presented for concerns regarding urinary tract infection.  Urinalysis showed trace ketones and blood.  Patient stated that her symptoms are mild and she would rather wait for the urine culture to dictate with the antibiotic is indicated.  In the meantime, continue with fluid hydration and monitor symptoms.  -     Urinalysis-UC if Indicated  -     Culture, Urine    SUBJECTIVE:    Viv Rubio is a 38 y.o. female who came in today for few days of urinary frequency, urinary urgency, leakage, and dysuria.  She describes a burning sensation when she urinates.  She has lower abdominal discomfort and has been feeling feverish.  She denies any back pain but has felt fatigued and nauseous.  Denies vomiting.  Patient had a urinary tract infection 3 months ago and questions why she keeps having these recurrent symptoms.    Review of Systems (except those mentioned above)  Constitutional: Negative.   HENT: Negative.   Eyes: Negative.   Respiratory: Negative.   Cardiovascular: Negative.   Gastrointestinal: Negative.   Endocrine: Negative.   Genitourinary: Negative.   Musculoskeletal: Negative.   Skin: Negative.   Allergic/Immunologic: Negative.   Neurological: Negative.   Hematological: Negative.   Psychiatric/Behavioral: Negative.     Patient Active Problem List    Diagnosis Date Noted     Thyroid nodule, 1.5x 1.3 cm right, anticipate repeat US 8/2018 10/12/2017     No Known Allergies  Current Outpatient Medications   Medication Sig Dispense Refill     hydrOXYzine HCl (ATARAX) 50 MG tablet TAKE 1 TAB AT BEDTIME AS NEEDED. 30 tablet 0     levonorgestrel (MIRENA) 20 mcg/24 hr (5 years) IUD 1 each by Intrauterine route once.       No current facility-administered medications for this visit.      Past Medical History:   Diagnosis Date     Seizures (H)     medically cleared since age 24     Thyroid nodule  10/12/2017     Past Surgical History:   Procedure Laterality Date     CHOLECYSTECTOMY  2011     [place] REMOVAL GALLBLADDER      Description: Cholecystectomy;  Recorded: 10/08/2014;     US THYROID BIOPSY  12/3/2018     Social History     Socioeconomic History     Marital status:      Spouse name: None     Number of children: None     Years of education: None     Highest education level: None   Occupational History     None   Social Needs     Financial resource strain: None     Food insecurity:     Worry: None     Inability: None     Transportation needs:     Medical: None     Non-medical: None   Tobacco Use     Smoking status: Never Smoker     Smokeless tobacco: Never Used   Substance and Sexual Activity     Alcohol use: No     Drug use: No     Sexual activity: Yes     Partners: Male   Lifestyle     Physical activity:     Days per week: None     Minutes per session: None     Stress: None   Relationships     Social connections:     Talks on phone: None     Gets together: None     Attends Buddhist service: None     Active member of club or organization: None     Attends meetings of clubs or organizations: None     Relationship status: None     Intimate partner violence:     Fear of current or ex partner: None     Emotionally abused: None     Physically abused: None     Forced sexual activity: None   Other Topics Concern     None   Social History Narrative     None     Family History   Problem Relation Age of Onset     Multiple sclerosis Other         family history of      Diabetes Other      Hypertension Other      Multiple sclerosis Mother          OBJECTIVE:    Vitals:    05/16/19 1458   BP: 110/76   Patient Site: Left Arm   Patient Position: Sitting   Cuff Size: Adult Regular   Pulse: 84   Weight: 130 lb 3 oz (59.1 kg)     Body mass index is 23.06 kg/m .    Physical Exam:  Constitutional: Patient was oriented to person, place, and time. Patient appeared well-developed and well-nourished. No distress.   Head:  Normocephalic and atraumatic.   Right Ear: External ear normal.   Left Ear: External ear normal.   Nose: Nose normal.   Eyes: Conjunctivae and EOM were normal. Right eye exhibited no discharge. Left eye exhibited no discharge. No scleral icterus.   Abdominal: Soft. Bowel sounds were normal. Patient exhibited no distension and no mass. There was no tenderness. There was no rebound and no guarding.  No tenderness to percussion of the costovertebral angle  Skin: Skin was warm and dry. No rash noted. Patient was not diaphoretic. No erythema. No pallor.       Results for orders placed or performed in visit on 05/16/19   Urinalysis-UC if Indicated   Result Value Ref Range    Color, UA Yellow Colorless, Yellow, Straw, Light Yellow    Clarity, UA Clear Clear    Glucose, UA Negative Negative    Bilirubin, UA Negative Negative    Ketones, UA Trace (!) Negative    Specific Gravity, UA 1.020 1.005 - 1.030    Blood, UA Trace (!) Negative    pH, UA 7.0 5.0 - 8.0    Protein, UA Negative Negative mg/dL    Urobilinogen, UA 0.2 E.U./dL 0.2 E.U./dL, 1.0 E.U./dL    Nitrite, UA Negative Negative    Leukocytes, UA Negative Negative    Bacteria, UA None Seen None Seen hpf    RBC, UA 0-2 None Seen, 0-2 hpf    WBC, UA 0-5 None Seen, 0-5 hpf    Squam Epithel, UA 5-10 (!) None Seen, 0-5 lpf

## 2021-05-30 VITALS — BODY MASS INDEX: 23.2 KG/M2 | WEIGHT: 132 LBS

## 2021-05-30 VITALS — WEIGHT: 132.44 LBS | BODY MASS INDEX: 23.28 KG/M2

## 2021-05-31 VITALS — HEIGHT: 63 IN | BODY MASS INDEX: 23.44 KG/M2 | WEIGHT: 132.31 LBS

## 2021-05-31 VITALS — WEIGHT: 135.31 LBS | BODY MASS INDEX: 23.97 KG/M2

## 2021-05-31 VITALS — BODY MASS INDEX: 23.62 KG/M2 | WEIGHT: 134.4 LBS

## 2021-06-02 ENCOUNTER — RECORDS - HEALTHEAST (OUTPATIENT)
Dept: ADMINISTRATIVE | Facility: CLINIC | Age: 41
End: 2021-06-02

## 2021-06-02 VITALS — HEIGHT: 63 IN | WEIGHT: 131 LBS | BODY MASS INDEX: 23.21 KG/M2

## 2021-06-02 VITALS — BODY MASS INDEX: 23.25 KG/M2 | WEIGHT: 131.25 LBS

## 2021-06-02 VITALS — BODY MASS INDEX: 23.42 KG/M2 | WEIGHT: 132.19 LBS

## 2021-06-03 ENCOUNTER — RECORDS - HEALTHEAST (OUTPATIENT)
Dept: ADMINISTRATIVE | Facility: CLINIC | Age: 41
End: 2021-06-03

## 2021-06-03 VITALS
OXYGEN SATURATION: 99 % | BODY MASS INDEX: 23.83 KG/M2 | TEMPERATURE: 98.2 F | HEIGHT: 63 IN | SYSTOLIC BLOOD PRESSURE: 104 MMHG | DIASTOLIC BLOOD PRESSURE: 78 MMHG | WEIGHT: 134.5 LBS | HEART RATE: 81 BPM

## 2021-06-03 VITALS — BODY MASS INDEX: 23.06 KG/M2 | WEIGHT: 130.19 LBS

## 2021-06-03 NOTE — PROGRESS NOTES
"ASSESSMENT:  1. Urinary frequency    I reassured her that the urine was fairly unremarkable.  We will send it out for a culture and see if anything grows and if it does, we will let her know and call her in something.    Otherwise I think she probably has a viral thing this going around and she can continue with symptomatic treatment and good hydration and following up again if not improving.    - Urinalysis-UC if Indicated  - Culture, Urine            PLAN:  There are no Patient Instructions on file for this visit.    Orders Placed This Encounter   Procedures     Culture, Urine     Urinalysis-UC if Indicated     There are no discontinued medications.    No follow-ups on file.    CHIEF COMPLAINT:  Chief Complaint   Patient presents with     Urinary Frequency     Nausea, Frequency/Urgency, low grade fever, nausea       HISTORY OF PRESENT ILLNESS:  Viv is a 38 y.o. female presenting to the clinic today with concerns about a possible UTI.  She states that she has had some urinary frequency and dysuria for the last couple of days.  She also states that she is just \"not felt right\" over the last week.  She has had a bit of nausea and muscle aches and fatigue.  She has had no nasal drainage or cough or sinus pressure or drainage.  Her appetite is been fair and she has has had a bit of diarrhea also.  No fevers or chills noted.    REVIEW OF SYSTEMS:     All other systems are negative.    PFSH:    Reviewed      TOBACCO USE:  Social History     Tobacco Use   Smoking Status Never Smoker   Smokeless Tobacco Never Used       VITALS:  Vitals:    11/18/19 1407   BP: 104/78   Patient Site: Left Arm   Patient Position: Sitting   Cuff Size: Adult Regular   Pulse: 81   Temp: 98.2  F (36.8  C)   SpO2: 99%   Weight: 134 lb 8 oz (61 kg)   Height: 5' 3\" (1.6 m)     Wt Readings from Last 3 Encounters:   11/18/19 134 lb 8 oz (61 kg)   05/16/19 130 lb 3 oz (59.1 kg)   03/18/19 132 lb 3 oz (60 kg)     Body mass index is 23.83 " kg/m .    PHYSICAL EXAM:  Constitutional:  Well appearing patient in no acute distress.  Vitals:  Per nursing notes.    HEENT:  Normocephalic, atraumatic.  Ears are clear bilaterally, with no fluid or redness, and landmarks visible.  Pupils are equal and reactive to light, extraocular muscles intact, visual fields are full.  Nose is normal, and oropharynx is clear without redness.    Neck is without lymphadenopathy.    Lungs:  Clear to auscultation bilaterally without wheezes, rales or rhonchi.   Cardiac:  Regular rate and rhythm without murmurs, rubs, or gallops.     Legs show no cyanosis, clubbing or edema.  Palpation of the distal pulses are normal and symmetric.    Recent Results (from the past 24 hour(s))   Urinalysis-UC if Indicated   Result Value Ref Range    Color, UA Yellow Colorless, Yellow, Straw, Light Yellow    Clarity, UA Clear Clear    Glucose, UA Negative Negative    Bilirubin, UA Negative Negative    Ketones, UA Negative Negative    Specific Gravity, UA 1.015 1.005 - 1.030    Blood, UA Trace (!) Negative    pH, UA 7.0 5.0 - 8.0    Protein, UA Negative Negative mg/dL    Urobilinogen, UA 0.2 E.U./dL 0.2 E.U./dL, 1.0 E.U./dL    Nitrite, UA Negative Negative    Leukocytes, UA Negative Negative    Bacteria, UA None Seen None Seen hpf    RBC, UA 0-2 None Seen, 0-2 hpf    WBC, UA None Seen None Seen, 0-5 hpf    Squam Epithel, UA 0-5 None Seen, 0-5 lpf          MEDICATIONS:  Current Outpatient Medications   Medication Sig Dispense Refill     hydrOXYzine HCl (ATARAX) 50 MG tablet TAKE 1 TAB AT BEDTIME AS NEEDED. 30 tablet 0     levonorgestrel (MIRENA) 20 mcg/24 hr (5 years) IUD 1 each by Intrauterine route once.       No current facility-administered medications for this visit.

## 2021-06-04 VITALS
HEIGHT: 63 IN | BODY MASS INDEX: 23.09 KG/M2 | WEIGHT: 130.31 LBS | HEART RATE: 76 BPM | SYSTOLIC BLOOD PRESSURE: 102 MMHG | DIASTOLIC BLOOD PRESSURE: 66 MMHG

## 2021-06-05 ENCOUNTER — HEALTH MAINTENANCE LETTER (OUTPATIENT)
Age: 41
End: 2021-06-05

## 2021-06-05 VITALS
BODY MASS INDEX: 24.11 KG/M2 | WEIGHT: 136.13 LBS | OXYGEN SATURATION: 98 % | SYSTOLIC BLOOD PRESSURE: 102 MMHG | HEART RATE: 66 BPM | DIASTOLIC BLOOD PRESSURE: 70 MMHG

## 2021-06-06 NOTE — PATIENT INSTRUCTIONS - HE
You are being tested for Corona Virus 19, Influenza and possibly RSV.    We will call you with your results.    Isolate Yourself:    Isolate yourself while traveling.    Do Not allow any visitors within 6 feet.    Do Not go to work or school.    Do Not go to [employer],  centers, shopping, or other public places.    Do Not shake hands.    Avoid close contact with others (hugging, kissing).    Protect Others:    Cover Your Mouth and Nose with a mask, disposable tissue or wash cloth to avoid spreading germs to others.    Wash your hands and face frequently with soap and water    Call Back If: Breathing difficulty develops or you become worse.    For more information about COVID19 and options for caring for yourself at home, please visit the CDC website at https://www.cdc.gov/coronavirus/2019-ncov/about/steps-when-sick.html  For more options for care at [facility], please visit our website at [email or web address]

## 2021-06-07 NOTE — TELEPHONE ENCOUNTER
Coronavirus (COVID-19) Notification    Reason for call  Notify of Negative COVID-19 lab result, assess symptoms,  review [facility] recommendations    Lab Result    Lab test 2019-nCoV rRt-PCR  Oropharyngeal AND/OR nasopharyngeal swabs were NEGATIVE for 2019-nCoV RNA    RN Assessment (Patient s current Symptoms), include time called.  [Insert Left message here if message left]  12:10PM: Patient returned call. States she was notified by her clinic of her result.     RN Recommendations/Instructions per [facility]  Patient notified of Negative COVID-19.    Patient can discontinue Quarantine and is free to resume normal activites.  If Patient has questions that you are not able to answer they can contact PCP or MD hotline ([phone])    Please Contact your PCP clinic or return to the Emergency department if your:    Symptoms worsen or other concerning symptom's.      {Name]  Michelle Fierro RN  "[employer]"   ED lab result RN

## 2021-06-07 NOTE — TELEPHONE ENCOUNTER
Coronavirus (COVID-19) Notification    Reason for call  Notify of Negative COVID-19 lab result, assess symptoms,  review [facility] recommendations    Lab Result   Lab test 2019-nCoV rRt-PCR  Oropharyngeal AND/OR nasopharyngeal swabs were NEGATIVE for 2019-nCoV RNA    Unable to reach Patient by phone at this time  Left voicemail message requesting a call back between 10A to 6:30P to [facility] Result phone line at [phone].         [RN/LPN Name]  Ugo Carlton RN  [facility]  Emergency Dept Lab Result RN  Ph# [phone]

## 2021-06-07 NOTE — TELEPHONE ENCOUNTER
Test Results  Who is calling?:  Patient   Who ordered the test:  Alexis Contreras MD   Type of test: Lab  Date of test:  03/15/20  Where was the test performed:  In clinic   What are your questions/concerns?:  Needing to know the results.   Okay to leave a detailed message?:  Yes

## 2021-06-07 NOTE — TELEPHONE ENCOUNTER
Patient notified of negative result. No further questions.    Jamila TAYLOR CMA ([facility])

## 2021-06-08 NOTE — TELEPHONE ENCOUNTER
Patient unsure if she may have a fever.  Believes she has an UTI that has been brewing for about 3 weeks.  Initially had slight vaginal discharge with some odor.  Patient has been drinking a lot of fluids and Tylenol provides some relief.  At 3 AM she woke with urinary frequency, burning, pain, nausea.  Provided information for [facility].    Zulma Aguilar RN  [facility] Triage Nurse Advisor    Reason for Disposition    Painful urination AND EITHER frequency or urgency    Protocols used: URINATION PAIN - FEMALE-A-OH

## 2021-06-09 NOTE — PROGRESS NOTES
36-year-old female presents with 1 day of left eye conjunctivitis.  I will give her Polytrim.  Avoid rubbing her eyes.  Hand hygiene discussed.  Follow-up in 1 week if her symptoms do not improve or sooner if her symptoms worsen.  Patient verbalized understanding and agreed with the plan    ASSESSMENT/PLAN:  1. Conjunctivitis  - polymyxin B-trimethoprim (POLYTRIM) 10,000 unit- 1 mg/mL Drop ophthalmic drops; Instill one drop in affected eye(s) three times daily  Dispense: 10 mL; Refill: 0    CHIEF COMPLAINT:  Chief Complaint   Patient presents with     red eye     woke up this morning with left red eye.  pain under the eye       HISTORY OF PRESENT ILLNESS:  Viv is a 36 y.o. female presenting to the clinic today for eye redness. This started this morning, and she woke up with it. She had some matted material around her eye. She has some eye discomfort, redness and some dryness. She did have some URI symptoms last week with a sore throat, feeling feverish and congestion. She was having some trouble sleeping last night because she was congested. She wears glasses, but very rarely wears contact lenses. She does note that her son's  had pink eye last week.       REVIEW OF SYSTEMS:   No excessive tearing from her left eye. Her right eye does not bother her.    All other systems are negative.    PFSH:  No new history.     TOBACCO USE:  History   Smoking Status     Never Smoker   Smokeless Tobacco     Never Used       VITALS:  Vitals:    03/20/17 1559   BP: 110/80   Patient Site: Left Arm   Patient Position: Sitting   Cuff Size: Adult Regular   Pulse: 92   Weight: 132 lb 7 oz (60.1 kg)     Wt Readings from Last 3 Encounters:   03/20/17 132 lb 7 oz (60.1 kg)   10/10/16 128 lb 6.4 oz (58.2 kg)   10/06/15 122 lb 14.4 oz (55.7 kg)       PHYSICAL EXAM:  Constitutional: Patient is oriented to person, place, and time. Patient appears well-developed and well-nourished. No distress.   Eyes: Conjunctivae and EOM are normal.  Pupils are equal, round, and reactive to light. Right eye exhibits no discharge. Left eye exhibits no discharge. No scleral icterus. Pink palpebral conjunctiva particularly on medial side.   Cardiovascular: Normal rate, regular rhythm, normal heart sounds and intact distal pulses. No murmur heard.   Pulmonary/Chest: Effort normal and breath sounds normal. No stridor. No respiratory distress. Patient has no wheezes, no rales, exhibits no tenderness.   Neurological: Patient is alert and oriented to person, place, and time. Patient has normal reflexes. No cranial nerve deficit. Coordination normal.   Skin: Skin is warm and dry. No rash noted. Patient is not diaphoretic. No erythema. No pallor.    Results for orders placed or performed in visit on 10/10/16   Lipid Cascade   Result Value Ref Range    Cholesterol 157 <=199 mg/dL    Triglycerides 102 <=149 mg/dL    HDL Cholesterol 48 (L) >=50 mg/dL    LDL Calculated 89 <=129 mg/dL    Patient Fasting > 8hrs? Yes    Glucose   Result Value Ref Range    Glucose 86 70 - 99 mg/dL    Patient Fasting > 8hrs? Yes          ADDITIONAL HISTORY SUMMARIZED (2): None.  DECISION TO OBTAIN EXTRA INFORMATION (1): None.   RADIOLOGY TESTS (1): None.  LABS (1): None.  MEDICINE TESTS (1): None.  INDEPENDENT REVIEW (2 each): None.     The visit lasted a total of 15 minutes face to face with the patient. Over 50% of the time was spent counseling and educating the patient about conjunctivitis care.    IAlem, am scribing for and in the presence of, Dr. Cevallos.    IDr. Cevallos, personally performed the services described in this documentation, as scribed by Alem Pang in my presence, and it is both accurate and complete.    MEDICATIONS:  Current Outpatient Prescriptions   Medication Sig Dispense Refill     norgestimate-ethinyl estradiol (SPRINTEC, 28,) 0.25-35 mg-mcg per tablet Take one tablet daily continuous, skipping placebo week for 3 months then repeat 84 tablet 4      polymyxin B-trimethoprim (POLYTRIM) 10,000 unit- 1 mg/mL Drop ophthalmic drops Instill one drop in affected eye(s) three times daily 10 mL 0     No current facility-administered medications for this visit.        Total data points: 0

## 2021-06-10 NOTE — PROGRESS NOTES
ASSESSMENT/PLAN:  1. Back pain  36-year-old female presents with 24 hour of low back pain associated with urinary frequency and nausea but no other associated symptoms.  All of her symptoms have resolved and she is asymptomatic today.  Urinalysis showed trace blood.  I will wait for the urine culture.  In the meantime continue to monitor her symptoms, push fluid hydration, and rest.  Patient verbalized understanding and agreed with the plan  - Urinalysis-UC if Indicated  - Culture, Urine      Orders Placed This Encounter   Procedures     Culture, Urine     Urinalysis-UC if Indicated           CHIEF COMPLAINT:  Chief Complaint   Patient presents with     Back Pain     lower back  left side x last night     Fever     5/12 and 5/14     Nausea     frequency urination       HISTORY OF PRESENT ILLNESS:  Viv is a 36 y.o. female presenting to the clinic today for back pain. About 4 days ago, she started to feel chilled and achy. She felt feverish and was also nauseated. The next day, she felt onset of left back and flank pain. She is having some mild urinary frequency. This morning, she started to feel better, and the back pain is much better. She did not strain or injure back. The pain moved to her left side. Touching the area did not cause the pain.     REVIEW OF SYSTEMS:   All other systems are negative.    PFSH:  No new history.     TOBACCO USE:  History   Smoking Status     Never Smoker   Smokeless Tobacco     Never Used       VITALS:  Vitals:    05/15/17 1130   BP: 112/80   Patient Site: Left Arm   Patient Position: Sitting   Cuff Size: Adult Regular   Pulse: 68   Temp: 98.3  F (36.8  C)   TempSrc: Oral   Weight: 132 lb (59.9 kg)     Wt Readings from Last 3 Encounters:   05/15/17 132 lb (59.9 kg)   03/20/17 132 lb 7 oz (60.1 kg)   10/10/16 128 lb 6.4 oz (58.2 kg)       PHYSICAL EXAM:  Constitutional: Patient is oriented to person, place, and time. Patient appears well-developed and well-nourished. No  distress.   Head: Normocephalic and atraumatic.   Pulmonary/Chest: Effort normal and breath sounds normal. No stridor. No respiratory distress. Patient has no wheezes, no rales, exhibits no tenderness.   Abdominal: Soft. Bowel sounds are normal. Patient exhibits no distension and no mass. There is no rebound and no guarding. Suprapubic discomfort on palpation. Left upper quadrant and umbilical tenderness to palpation.   Skin: Skin is warm and dry. No rash noted. Patient is not diaphoretic. No erythema. No pallor.  Back: discomfort to palpation to left CVA compared to right.     Results for orders placed or performed in visit on 05/15/17   Urinalysis-UC if Indicated   Result Value Ref Range    Color, UA Yellow Colorless, Yellow, Straw, Light Yellow    Clarity, UA Clear Clear    Glucose, UA Negative Negative    Bilirubin, UA Negative Negative    Ketones, UA Negative Negative    Specific Gravity, UA 1.010 1.005 - 1.030    Blood, UA Trace (!) Negative    pH, UA 7.0 5.0 - 8.0    Protein, UA Negative Negative mg/dL    Urobilinogen, UA 0.2 E.U./dL 0.2 E.U./dL, 1.0 E.U./dL    Nitrite, UA Negative Negative    Leukocytes, UA Negative Negative    Bacteria, UA Few (!) None Seen hpf    RBC, UA 0-2 None Seen, 0-2 hpf    WBC, UA None Seen None Seen, 0-5 hpf    Squam Epithel, UA 0-5 None Seen, 0-5 lpf         ADDITIONAL HISTORY SUMMARIZED (2): None.  DECISION TO OBTAIN EXTRA INFORMATION (1): None.   RADIOLOGY TESTS (1): None.  LABS (1): Ordered and reviewed labs today.  MEDICINE TESTS (1): None.  INDEPENDENT REVIEW (2 each): None.     IAlem, am scribing for and in the presence of, Dr. Cevallos.    IDr. Cevallos, personally performed the services described in this documentation, as scribed by Alem Pang in my presence, and it is both accurate and complete.    MEDICATIONS:  Current Outpatient Prescriptions   Medication Sig Dispense Refill     norgestimate-ethinyl estradiol (SPRINTEC, 28,) 0.25-35 mg-mcg  per tablet Take one tablet daily continuous, skipping placebo week for 3 months then repeat 84 tablet 4     polymyxin B-trimethoprim (POLYTRIM) 10,000 unit- 1 mg/mL Drop ophthalmic drops Instill one drop in affected eye(s) three times daily 10 mL 0     No current facility-administered medications for this visit.        Total data points: 1

## 2021-06-12 NOTE — TELEPHONE ENCOUNTER
Se patient other MyChart message from 10/20. Flu vaccination is in chart.    Jamila TAYLOR CMA ([facility])

## 2021-06-12 NOTE — PROGRESS NOTES
Assessment/Plan:        Diagnoses and all orders for this visit:    Throat discomfort  -     Thyroid Stimulating Hormone (TSH)  -     T4, Free  -     US Thyroid; Future; Expected date: 8/28/17    Fatigue  -     Thyroid Stimulating Hormone (TSH)  -     T4, Free  -     Vitamin D, Total (25-Hydroxy)  -     HM2(CBC w/o Differential)  -     Comprehensive Metabolic Panel        Since most of her symptoms seem to be associated with her thyroid, will do labs.  We will also do a thyroid ultrasound to make sure that there is no other deeper lesions.  Fatigue multifactorial.  We will do labs. If normal workup, closely monitor symptoms.  Recheck with PCP if worse.  She was agreeable with the plans.  Subjective:    Patient ID: Viv Rubio is a 36 y.o. female.    HPI    Viv is here with concerns about pain in her left side of her neck for the past couple of months.  Feels like a pressure sensation, pulsating.  Initially would last for around 1-2 minutes but now is more frequent and longer.  Able to tolerate food.  No choking spells. For the past couple of weeks, feels the need to clear her throat.  Noted hoarseness in her voice last week.  She feels that there is something stuck in her throat when she swallows.  She has had problems with temperature regulation and feels cold most of the time, stable.  She feels that she is gaining more weight.  Gained around 14 pounds in the past 1 and half years despite healthy lifestyle, regular exercise.  She is noted some spotting this summer.  She has been taking her OCP regularly.  Has been more anxious and affecting her sleep.  Unsure of triggers.  Has had anxiety in the past and has been trying to do breathing exercises, counting.  Was followed by psychiatrist before and medications were causing ill effects.  Her relaxation techniques were helping before but for the past 1 month, not beneficial.  No life altering events.  Decreased sexual drive as well which puts a strain on the  relationship.  Otherwise marriage has been good.  She usually has a lot of energy but feels more tired at this time.  [place] but now feels achy and tired by the end of the day.    Review of Systems  As above otherwise negative.        Objective:    Physical Exam  /70 (Patient Site: Left Arm, Patient Position: Sitting, Cuff Size: Adult Regular)  Pulse 86  Temp 98.3  F (36.8  C) (Oral)   Wt 134 lb 6.4 oz (61 kg)  LMP 07/14/2017 (Approximate)  SpO2 99%  Breastfeeding? No  BMI 23.62 kg/m2    Vital signs noted above. AAO ×3.  HEENT no nasal discharge, moist oral mucosa. Neck: Supple neck, nonpalpable cervical lymph nodes.  Thyroid does not feel enlarged.  No solitary nodule palpable.  Lungs: Clear to auscultation bilateral.  Heart: S1-S2 regular rate and rhythm, no murmurs were noted.  Abdomen:  with bowel sounds.

## 2021-06-13 NOTE — PROGRESS NOTES
HPI: This patient is a 37yo F who presents for evaluation of her throat at the request of Dr. Brito. She was a little under the weather toward the end of August, and subsequently has had a globus sensation and mild hoarseness. Denies fevers, otalgia, weight loss, odynophagia, dysphagia, hemoptysis, and shortness of breath. Does not complain of sour taste, heartburn, or burping. Is not taking reflux medication.    Past medical history, surgical history, social history, family history, medications, and allergies have been reviewed with the patient and are documented above.    Review of Systems: a 10-system review was performed. Pertinent positives are noted in the HPI and on a separate scanned document in the chart.    PHYSICAL EXAMINATION:  GEN: no acute distress, normocephalic  EYES: extraocular movements are intact, pupils are equal and round. Sclera clear.   EARS: auricles are normally formed. The external auditory canals are clear with minimal to no cerumen. Tympanic membranes are intact bilaterally with no signs of infection, effusion, retractions, or perforations.  NOSE: anterior nares are patent. There are no masses or lesions. The septum is non-obstructing.  OC/OP: clear, dentition is in good repair. The tongue and palate are fully mobile and symmetric. The floor of mouth and base of tongue are symmetric. Cobblestoning of the posterior pharyngeal wall.  HP/L (scope): nasopharynx, base of tongue, vallecula, epiglottis, and pyriform sinuses are clear. The bilateral vocal folds are mobile and without lesion. There is mild interarytenoid edema and erythema.  NECK: soft and supple. No lymphadenopathy or masses. Airway is midline.  NEURO: CN II-XII are intact bilaterally. alert and oriented x 3. No nystagmus. Gait is normal.  PULM: breathing comfortably on room air, normal chest expansion with respiration      MEDICAL DECISION-MAKING: This patient is a 37yo F with mild chronic laryngitis/globus sensation from acid  reflux. Discussed diet and lifestyle changes in addition to risks and benefits of short-term H2 blocker vs PPI therapy.

## 2021-06-13 NOTE — PROGRESS NOTES
ASSESSMENT/PLAN:    Routine general medical examination at a health care facility  We discussed healthy lifestyle, nutrition, cardiovascular risk reduction, self care, safety, sunscreen, and seatbelt use.  Health maintenance screening and immunizations reviewed with the patient.    Cervical cancer screening  -     Gynecologic Cytology (PAP Smear)    Screening cholesterol level  -     Lipid Cascade    Diabetes mellitus screening  -     Glucose    Anxiety, mood d/o, decreased libido, marital strain  -     Ambulatory referral to Psychology  I also recommend couple counseling but she is not sure if her  is good  Motivational interviewing utilized  Refused pharmacotherapy  F/u in 1 month    Contraception  -     norgestimate-ethinyl estradiol (ESTARYLLA) 0.25-35 mg-mcg per tablet; TAKE ONE TABLET DAILY CONTINUOUS, SKIPPING PLACEBO WEEK FOR 3 MONTHS THEN REPEAT  Dispense: 84 tablet; Refill: 3  She has had irregular menstrual cycles over the last few months.  I recommend that she stops the prolonged use of oral contraceptives and instead take them monthly to induce monthly menstrual cycles for the next 2-3 cycles until she reaches normal regular monthly menstrual bleeding.  Cbc, cmp, TSH/T4 were unremarkable    Thyroid nodule, right  Incidentally discovered via ultrasound August 2017 in the workup for dysphagia and hoarseness.  Will anticipate a repeat ultrasound in August 2018    Dysphagia/hoarse voice  Has upcoming appt with ENT    CHIEF COMPLAINT:  Chief Complaint   Patient presents with     Annual Exam     Px, Pt is fasting.      Medication Refill     discuss maybe BC       SUBJECTIVE:  Viv Rubio is a 36 y.o. female who is here for a health maintenance visit. Her blood pressure and pulse are within normal limits. Her BMI is 23. She is fasting today. She is due for a PAP smear. No history of abnormal PAP smears. Immunizations are up to date.     Throat Discomfort/Anxiety: She saw Dr. Brito in late August  2017 for throat discomfort, hoarse voice and swallowing issues. She had labs and thyroid US at that time which were normal, but did show a benign thyroid nodule. She is still dealing with this; she is seeing ENT this afternoon. She feels like her difficulties swallowing are somewhat improved, but hoarse voice is unchanged. It came on when she had onset of dry cough in July. She had no other infectious symptoms at that time. The cough stopped, but she retained hoarse voice and difficulties swallowing. She still feels like she cannot swallow, and she feels like she chokes on food and drink. Denies any nausea, heartburn/reflux, burning in chest or stomach. This is causing her to have increased anxieties. The anxiety is becoming a bigger issue for her. She feels like her coping strategies are not working for her anymore. She was treated for anxiety, but she did not like how the medications made her feel. She would like to go back to work with a therapist on coping mechanisms. She is noting some decreased libido, and this is causing strain between she and her . She is wondering if this is from the anxiety or hormone imbalance. She is taking continual OCP's, and she is consistent with these. She is still having spotting while on continual OCPs. NO known family history of thyroid dysfunction.     Little interest or pleasure in doing things: Several days  Feeling down, depressed, or hopeless: Several days  Trouble falling or staying asleep, or sleeping too much: Nearly every day  Feeling tired or having little energy: Nearly every day  Poor appetite or overeating: More than half the days  Feeling bad about yourself - or that you are a failure or have let yourself or your family down: Nearly every day  Trouble concentrating on things, such as reading the newspaper or watching television: More than half the days  Moving or speaking so slowly that other people could have noticed. Or the opposite - being so fidgety or  "restless that you have been moving around a lot more than usual: Not at all  Thoughts that you would be better off dead, or of hurting yourself in some way: Not at all  PHQ-9 Total Score: 15  If you checked off any problems, how difficult have these problems made it for you to do your work, take care of things at home, or get along with other people?: Somewhat difficult    Feeling nervous, anxious or on edge: 3  Not being able to stop or control worry: 3  Worrying too much about different things: 3  Trouble relaxing: 3  Being so restless that is is hard to sit still: 3  Becoming easily annnoyed or irritable: 3  Feeling afraid as if something awful might happen: 2  SHANNEN-7 Total: 20  How difficult did these problems make it for you to do your work, take care of things at home or get along with other people? : Very difficult    REVIEW OF SYSTEMS:   Bladder and bowel functions are stable. Hearing and vision are stable. All other systems are negative.    PFSH:  No new history.     History   Smoking Status     Never Smoker   Smokeless Tobacco     Never Used       Family History   Problem Relation Age of Onset     Multiple sclerosis Other      family history of      Diabetes Other      Hypertension Other      Multiple sclerosis Mother        Past Surgical History:   Procedure Laterality Date     CHOLECYSTECTOMY  2011     [place] REMOVAL GALLBLADDER      Description: Cholecystectomy;  Recorded: 10/08/2014;       No Known Allergies      OBJECTIVE:   Physical Exam   Vitals:    10/12/17 0743   BP: 98/76   Pulse: 76   Weight: 132 lb 5 oz (60 kg)   Height: 5' 3\" (1.6 m)     Estimated body mass index is 23.44 kg/(m^2) as calculated from the following:    Height as of this encounter: 5' 3\" (1.6 m).    Weight as of this encounter: 132 lb 5 oz (60 kg).    Constitutional: Patient is oriented to person, place, and time. Patient appears well-developed and well-nourished. No distress.   Head: Normocephalic and atraumatic.   Right Ear: " External ear normal. Ear canal and TM normal.   Left Ear: External ear normal. Ear canal and TM normal.   Nose: Nose normal.   Mouth/Throat: Oropharynx is clear and moist. No oropharyngeal exudate.   Eyes: Conjunctivae and EOM are normal. Pupils are equal, round, and reactive to light. Right eye exhibits no discharge. Left eye exhibits no discharge. No scleral icterus.   Neck: Neck supple. No JVD present. No tracheal deviation present. No thyromegaly present.   Breasts:  Normal appearing, no skin involvement, no palpable mass, no tenderness on palpation.  No axillary involvement  Cardiovascular: Normal rate, regular rhythm, normal heart sounds and intact distal pulses. No murmur heard.   Pulmonary/Chest: Effort normal and breath sounds normal. No stridor. No respiratory distress. Patient has no wheezes, no rales, exhibits no tenderness.   Abdominal: Soft. Bowel sounds are normal. Patient exhibits no distension and no mass. There is no tenderness. There is no rebound and no guarding.   Lymphadenopathy:  Patient has no cervical adenopathy.   Neurological: Patient is alert and oriented to person, place, and time. Patient has normal reflexes. No cranial nerve deficit. Coordination normal.   Skin: Skin is warm and dry. No rash noted. Patient is not diaphoretic. No erythema. No pallor.   Pelvic:  Normal external genitalia with Normal vulva.  Normal vagina with no polyps or lesions and with physiologic discharge.  Normal cervix with normal mucosa and without CMT.  No adnexal masses  Psychiatric: Patient has good eye contact without any psychomotor retardation or stereotypic behaviors.  normal mood and affect. Judgment and thought content normal.   Speech is regular rate and rhythm.           ADDITIONAL HISTORY SUMMARIZED (2): Reviewed note from Dr. Brito from 8/28/2017.  DECISION TO OBTAIN EXTRA INFORMATION (1): None.   RADIOLOGY TESTS (1): Reviewed thyroid US from 8/29/2017.  LABS (1): Reviewed and ordered labs  today.  MEDICINE TESTS (1): None.  INDEPENDENT REVIEW (2 each): None.     The visit lasted a total of 28 minutes face to face with the patient. Over 50% of the time was spent counseling and educating the patient about health maintenance and anxiety.    I, Alem Pang, am scribing for and in the presence of, Dr. Cevallos.    IAlexis MD , personally performed the services described in this documentation, as scribed by Alem Pang in my presence, and it is both accurate and complete.      MEDICATIONS:  Current Outpatient Prescriptions   Medication Sig Dispense Refill     norgestimate-ethinyl estradiol (ESTARYLLA) 0.25-35 mg-mcg per tablet TAKE ONE TABLET DAILY CONTINUOUS, SKIPPING PLACEBO WEEK FOR 3 MONTHS THEN REPEAT 84 tablet 3     No current facility-administered medications for this visit.          Total data points: 4

## 2021-06-14 NOTE — PROGRESS NOTES
ASSESSMENT/PLAN:  Anxiety, mood d/o  She had her first initial intake with the psychotherapist.  She, overall, is better than our previous visit.  We spoke about pharmacotherapy again but she deferred.  She will continue to work on healthy lifestyle modification and psychotherapy.    Contraception  I refilled her oral contraceptives.  She is interested in more permanent option.  As result I provided her with a referral to gynecology  -     Ambulatory referral to Obstetrics / Gynecology  -     norgestimate-ethinyl estradiol (ESTARYLLA) 0.25-35 mg-mcg per tablet; TAKE ONE TABLET DAILY CONTINUOUS, SKIPPING PLACEBO WEEK FOR 3 MONTHS THEN REPEAT  Dispense: 84 tablet; Refill: 3      Orders Placed This Encounter   Procedures     Ambulatory referral to Obstetrics / Gynecology     Referral Priority:   Routine     Referral Type:   Consultation     Referral Reason:   Evaluation and Treatment     Requested Specialty:   Obstetrics and Gynecology     Number of Visits Requested:   1     CHIEF COMPLAINT:  Chief Complaint   Patient presents with     med check     discuss med may need  refill        HISTORY OF PRESENT ILLNESS:  Viv is a 36 y.o. female presenting to the clinic today for a follow up for contraception. She is currently taking OCP's. She had had some vaginal bleeding, and stopped continuous OCP's, and went back to monthly use. The abnormal bleeding did resolve for her. She is noting that the monthly use of OCP's has caused her to have headaches. She is not interested in having more children, and is looking for a more permanent form of birth control. She has realize that some of her anxiety stems from the possibility of becoming pregnant again.     Hoarse Voice: She did see ENT for her voice hoarseness. Dr. Rodríguez suggested that this could be from reflux, and that she could try some antacids. She was also advised to work on stress reduction. She has not tried any medications, but she is seeing a therapist, and had  her first appointment last week.     Additionally, her blood pressure is elevated today at 108/90.     REVIEW OF SYSTEMS:   Constitutional: Negative.   HENT: Negative.   Eyes: Negative.   Respiratory: Negative.   Cardiovascular: Negative.   Gastrointestinal: Negative.   Endocrine: Negative.   Genitourinary: Negative.   Musculoskeletal: Negative.   Skin: Negative.   Allergic/Immunologic: Negative.   Neurological: Negative.   Hematological: Negative.   All other systems are negative.    PFSH:  No new history.     TOBACCO USE:  History   Smoking Status     Never Smoker   Smokeless Tobacco     Never Used       VITALS:  Vitals:    11/13/17 1550   BP: 108/90   Pulse: 84   Weight: 135 lb 5 oz (61.4 kg)     Wt Readings from Last 3 Encounters:   11/13/17 135 lb 5 oz (61.4 kg)   10/12/17 132 lb 5 oz (60 kg)   08/28/17 134 lb 6.4 oz (61 kg)       PHYSICAL EXAM:  Constitutional: Patient is oriented to person, place, and time. Patient appears well-developed and well-nourished. No distress.   Cardiovascular: Normal rate.  Pulmonary/Chest: Effort normal. No respiratory distress.   Neurological: Patient is alert and oriented to person, place, and time.  The patient has good eye contact.  No psychomotor retardation or stereotypical behaviors.  Speech is regular rate, regular rhythm, adequate responses.  Mood is stable and affect is congruent mood.  No suicidal or homicidal intent.  No hallucination.      Results for orders placed or performed in visit on 10/12/17   Lipid Cascade   Result Value Ref Range    Cholesterol 198 <=199 mg/dL    Triglycerides 121 <=149 mg/dL    HDL Cholesterol 52 >=50 mg/dL    LDL Calculated 122 <=129 mg/dL    Patient Fasting > 8hrs? Yes    Glucose   Result Value Ref Range    Glucose 83 70 - 125 mg/dL    Patient Fasting > 8hrs? Yes    Gynecologic Cytology (PAP Smear)   Result Value Ref Range    Case Report       Gynecologic Cytology Report                       Case: [id number]                                    Authorizing Provider:  Alexis Candice Thao         Collected:           10/12/2017 0926                                     MD pAurva                                                                 Ordering Location:     [facility]   Received:            10/12/2017 0933                                     Family Medicine/OB                                                           First Screen:          DEBI Peacock                                                                             (ASCP)                                                                       Specimen:    SUREPATH PAP, SCREENING, Endocervical/cervical                                             Interpretation       Negative for squamous intraepithelial lesion or malignancy    Result Flag Normal Normal    Specimen Adequacy       Satisfactory for evaluation, endocervical/transformation zone component present    HPV Reflex? Yes regardless of result     HIGH RISK No     LMP/Menopause Date 9/11/17, prolonged oral contraceptive use     Abnormal Bleeding No     Pt Status na     Birth Control/Hormones Pill/patch/ring     Previous Normal/Date 2012     Prev Abn Date/Dx none     Cervical Appearance normal    HPV Cascade (PCR)   Result Value Ref Range    Interpretation No HPV Type(s) Detected No HPV Type(s) Detected, No High Risk HPV Type(s) Detected, DNA Quantity Not Sufficient     Martín Phipps MD, [facility]            ADDITIONAL HISTORY SUMMARIZED (2): Reviewed note from Dr. Rodríguez from 10/12/2017 regarding hoarse voice.  DECISION TO OBTAIN EXTRA INFORMATION (1): None.   RADIOLOGY TESTS (1): None.  LABS (1): None.  MEDICINE TESTS (1): None.  INDEPENDENT REVIEW (2 each): None.     The visit lasted a total of 9 minutes face to face with the patient. Over 50% of the time was spent counseling and educating the patient about contraception options and anxiety.    Alem CAMPO, am scribing for  and in the presence of, Dr. Cevallos.    I, Alexis Mixon MD , personally performed the services described in this documentation, as scribed by Alem Pang in my presence, and it is both accurate and complete.    MEDICATIONS:  Current Outpatient Prescriptions   Medication Sig Dispense Refill     norgestimate-ethinyl estradiol (ESTARYLLA) 0.25-35 mg-mcg per tablet TAKE ONE TABLET DAILY CONTINUOUS, SKIPPING PLACEBO WEEK FOR 3 MONTHS THEN REPEAT 84 tablet 3     No current facility-administered medications for this visit.        Total data points: 2

## 2021-06-14 NOTE — PROGRESS NOTES
"Viv Rubio is a 40 y.o. female who is being evaluated via a billable video visit.      How would you like to obtain your AVS? MyChart.    Will anyone else be joining your video visit? No      Video Start Time: 3:19 PM  Assessment & Plan     Viv was seen today for pelvic pain, vaginal issue and fyi.    Diagnoses and all orders for this visit:    Dysuria and vaginal irritation  Difficult to discern vaginal vs urinary based on her symptoms  Will ask her to come in for UA/UC and wet prep.  -     Urinalysis; Future  -     Culture, Urine; Future  -     Wet Prep, Vaginal; Future    Sore throat  Rapid strep due to known exposure  -     Rapid Strep A Screen- Throat Swab; Future      Rekhaua Candice Mixon MD  [facility]     Viv Rubio is 40 y.o. and presents to clinic today for the following health issues   HPI     Noted 4 wks ago   Started as itchiness, sweaty, odor of the vagina.  Treated OTC monistat which helped.  She felt better then noted recurrence of symptoms few days following completion of therapy  still not feeling well \"crummy\"  Mild burning with urination  Mild tenderness along the right vulvular area  Still with vaginal itchiness    Son tested positive strep today  Mild sore throat  Feels warm but no fever  Mild headache  Nausea but not abdominal pain      Objective       Vitals:  No vitals were obtained today due to virtual visit.    Physical Exam  Constitutional: Patient is oriented to person, place, and time. Patient appears well-developed and well-nourished. No distress.   Head: Normocephalic and atraumatic.   Right Ear: External ear normal.   Left Ear: External ear normal.   Nose: Nose normal.   Eyes: Conjunctivae and EOM are normal. Right eye exhibits no discharge. Left eye exhibits no discharge. No scleral icterus.   Neurological: Patient is alert and oriented to person, place, and time. No cranial nerve deficit. Coordination normal. "   Skin: No rash noted. Patient is not diaphoretic. No erythema. No pallor.     Video-Visit Details    Type of service:  Video Visit    Video End Time (time video stopped): 3:30 PM  Originating Location (pt. Location): Home    Distant Location (provider location):  [facility]     [facility]

## 2021-06-15 NOTE — TELEPHONE ENCOUNTER
Please assist the patient with a face-face appointment with me.  Ok to double if needed.  Thank you.  Have an awesome day.

## 2021-06-16 NOTE — PROGRESS NOTES
ASSESSMENT/PLAN:  Viv was seen today for follow-up.    Diagnoses and all orders for this visit:    Vaginal itching  -     Wet Prep, Vaginal  Negative  Wet prep  Reassurance provided  F/u prn    SUBJECTIVE:    Viv Rubio is a 40 y.o. female who came in today     2 days vaginal itchiness and discharge.  She has had some white had like lesions along be labia majora whereby she noticed keratin-like discharge upon popping.  She has had some swelling and pain in the vaginal canal as well.  No concerns for STD.  Patient was treated for vaginal yeast infection couple months ago.    Review of Systems (except those mentioned above)  Constitutional: Negative.   HENT: Negative.   Eyes: Negative.   Respiratory: Negative.   Cardiovascular: Negative.   Gastrointestinal: Negative.   Endocrine: Negative.   Genitourinary: Negative.   Musculoskeletal: Negative.   Skin: Negative.   Allergic/Immunologic: Negative.   Neurological: Negative.   Hematological: Negative.   Psychiatric/Behavioral: Negative.     Patient Active Problem List    Diagnosis Date Noted     Thyroid nodule, 1.5x 1.3 cm right, anticipate repeat US 8/2018 10/12/2017     No Known Allergies  Current Outpatient Medications   Medication Sig Dispense Refill     hydrOXYzine HCl (ATARAX) 50 MG tablet TAKE 1 TAB AT BEDTIME AS NEEDED 90 tablet 3     levonorgestrel (MIRENA) 20 mcg/24 hr (5 years) IUD 1 each by Intrauterine route once.       No current facility-administered medications for this visit.      Past Medical History:   Diagnosis Date     Seizures (H)     medically cleared since age 24     Thyroid nodule 10/12/2017     Past Surgical History:   Procedure Laterality Date     CHOLECYSTECTOMY  2011     [place] REMOVAL GALLBLADDER      Description: Cholecystectomy;  Recorded: 10/08/2014;     US THYROID BIOPSY  12/3/2018     Social History     Socioeconomic History     Marital status:      Spouse name: None     Number of children: None     Years of education: None      Highest education level: None   Occupational History     None   Social Needs     Financial resource strain: None     Food insecurity     Worry: None     Inability: None     Transportation needs     Medical: None     Non-medical: None   Tobacco Use     Smoking status: Never Smoker     Smokeless tobacco: Never Used   Substance and Sexual Activity     Alcohol use: No     Drug use: No     Sexual activity: Yes     Partners: Male   Lifestyle     Physical activity     Days per week: None     Minutes per session: None     Stress: None   Relationships     Social connections     Talks on phone: None     Gets together: None     Attends Bahai service: None     Active member of club or organization: None     Attends meetings of clubs or organizations: None     Relationship status: None     Intimate partner violence     Fear of current or ex partner: None     Emotionally abused: None     Physically abused: None     Forced sexual activity: None   Other Topics Concern     None   Social History Narrative     None     Family History   Problem Relation Age of Onset     Multiple sclerosis Other         family history of      Diabetes Other      Hypertension Other      Multiple sclerosis Mother          OBJECTIVE:    Vitals:    03/17/21 1638   BP: 102/70   Patient Site: Left Arm   Patient Position: Sitting   Cuff Size: Adult Regular   Pulse: 66   SpO2: 98%   Weight: 136 lb 2 oz (61.7 kg)     Body mass index is 24.11 kg/m .    Physical Exam:  Constitutional: Patient is oriented to person, place, and time. Patient appears well-developed and well-nourished. No distress.   Head: Normocephalic and atraumatic.   Right Ear: External ear normal.   Left Ear: External ear normal.   Eyes: Conjunctivae and EOM are normal. Right eye exhibits no discharge. Left eye exhibits no discharge. No scleral icterus.   Neurological: Patient is alert and oriented to person, place, and time.  Coordination normal.   Skin: No rash noted. Patient is not  diaphoretic. No erythema. No pallor.  Pelvic exam: normal external genitalia, vulva, vagina, cervix, uterus and adnexa.  I am not able to appreciate any the ya that she described.     Results for orders placed or performed in visit on 03/17/21   Wet Prep, Vaginal    Specimen: Genital   Result Value Ref Range    Yeast Result No yeast seen No yeast seen    Trichomonas No Trichomonas seen No Trichomonas seen    Clue Cells, Wet Prep No Clue cells seen No Clue cells seen

## 2021-06-21 NOTE — PROGRESS NOTES
FEMALE PREVENTATIVE EXAM    Assessment and Plan:     Routine general medical examination at a health care facility  We discussed healthy lifestyle, nutrition, cardiovascular risk reduction, self care, safety, sunscreen, seatbelt, and timing of cancer screening.  Health maintenance screening and immunizations reviewed with the patient.       Anxiety  Prn hydroxyzine to help with anxiety and sleep  -     hydrOXYzine HCl (ATARAX) 50 MG tablet; Take 1 tab at bedtime prn    Thyroid nodule  US for surveillance  -     US Thyroid; Future  -     Thyroid Cascade    Increased frequency of urination  -     Urinalysis-UC if Indicated  -     Culture, Urine      Next follow up:  One year    Immunization Review  Adult Imm Review: No immunizations due today    I discussed the following with the patient:   Adult Healthy Living: Importance of regular exercise  Healthy nutrition      Subjective:   Chief Complaint: Viv Rubio is an 37 y.o. female here for a preventative health visit.     HPI:    The patient was treated with Keflex 500 mg twice a day for 7 days about a month ago at the emergency room for urinary tract infection.  Since then she has never had complete resolution of symptoms.  She still complains of some urgency, some frequency, and mild burning with urination.  Her last menstrual period is not regular due to the Mirena.  She still has some spotting.  The patient has anxiety.  She saw a psychotherapist last year.  Her anxiety symptoms seems to be under decent control without medication.  PHQ 9 score of 3 with PHQ 2 score of 0.  GAD7 score was 8.    Healthy Habits  Are you taking a daily aspirin? No  Do you typically exercising at least 40 min, 3-4 times per week?  Yes  Do you usually eat at least 4 servings of fruit and vegetables a day, include whole grains and fiber and avoid regularly eating high fat foods? Yes  Have you had an eye exam in the past two years? Yes  Do you see a dentist twice per year? Yes  Do you  "have any concerns regarding sleep? YES    Safety Screen  If you own firearms, are they secured in a locked gun cabinet or with trigger locks? The patient does not own any firearms  No Data Recorded    Review of Systems:  Please see above.  The rest of the review of systems are negative for all systems.     Pap History:   Yes - updated in Problem List and Health Maintenance accordingly  Cancer Screening       Status Date      PAP SMEAR Next Due 10/12/2022      Done 10/12/2017 GYNECOLOGIC CYTOLOGY (PAP SMEAR)     Patient has more history with this topic...          Patient Care Team:  Alexis Mixon MD as PCP - General (Family Medicine)        History     Reviewed By Date/Time Sections Reviewed    Gloria Minaya CMA 10/30/2018  2:17 PM Tobacco            Objective:   Vital Signs:   Visit Vitals     /72     Pulse 84     Ht 5' 3\" (1.6 m)     Wt 131 lb (59.4 kg)     BMI 23.21 kg/m2          PHYSICAL EXAM    Objective:    Physical Exam   Vitals:    10/30/18 1417   BP: 102/72   Pulse: 84      Constitutional: Patient is oriented to person, place, and time. Patient appears well-developed and well-nourished. No distress.   Head: Normocephalic and atraumatic.   Right Ear: External ear normal. Normal TM  Left Ear: External ear normal. Normal TM  Nose: Nose normal.   Mouth/Throat: Oropharynx is clear and moist. No oropharyngeal exudate.   Eyes: Conjunctivae and EOM are normal. Pupils are equal, round, and reactive to light. Right eye exhibits no discharge. Left eye exhibits no discharge. No scleral icterus.   Neck: Neck supple. No JVD present. No tracheal deviation present. No thyromegaly present.   Cardiovascular: Normal rate, regular rhythm, normal heart sounds and intact distal pulses. No murmur heard.   Pulmonary/Chest: Effort normal and breath sounds normal. No stridor. No respiratory distress. Patient has no wheezes, no rales, exhibits no tenderness.   Abdominal: Soft. Bowel sounds are normal. Patient " exhibits no distension and no mass. There is no tenderness. There is no rebound and no guarding.   Lymphadenopathy:  Patient has no cervical adenopathy.   Neurological: Patient is alert and oriented to person, place, and time. Patient has normal reflexes. No cranial nerve deficit. Coordination normal.   Skin: Skin is warm and dry. No rash noted. Patient is not diaphoretic. No erythema. No pallor.   Normal breast exm         Medication List          These changes are accurate as of 10/30/18  4:47 PM.  If you have any questions, ask your nurse or doctor.               START taking these medications          hydrOXYzine HCl 50 MG tablet   Also known as:  ATARAX   INSTRUCTIONS:  Take 1 tab at bedtime prn   Started by:  Alexis Mixon MD             CONTINUE taking these medications          MIRENA 20 mcg/24 hr (5 years) IUD   INSTRUCTIONS:  1 each by Intrauterine route once.   Generic drug:  levonorgestrel             STOP taking these medications          cephalexin 500 MG capsule   Also known as:  KEFLEX   Stopped by:  Alexis Mixon MD       norgestimate-ethinyl estradiol 0.25-35 mg-mcg per tablet   Also known as:  ESTARYLLA   Stopped by:  Alexis Mixon MD            Where to Get Your Medications      These medications were sent to [id number] [place] [address] [address]     Phone:  [phone]      hydrOXYzine HCl 50 MG tablet             Additional Screenings Completed Today:

## 2021-06-24 NOTE — TELEPHONE ENCOUNTER
Pt called in states she is expecting antibiotic prescription from the PCP.  Inform the Pt the PCP is waiting for the sensitivity and will send Rx to the pharmacy.  The Pt verbalized understand, no other concern at this time.      Wilfred Toth RN, [facility] Triage/Med Refill 2/27/2019 6:40 PM

## 2021-06-24 NOTE — PROGRESS NOTES
ASSESSMENT/PLAN:  Urinary frequency, nausea  38-year-old female presented with 2 days of nausea right flank pain.  Urinalysis showed trace blood.  Will await urine culture.  In the meantime, push fluids and supportive cares.  Follow-up in 1 week if she has no improvement of symptoms.  She verbalized understanding and agreed with the plan  -     Urinalysis-UC if Indicated  -     Culture, Urine    Viral upper respiratory tract infection  Cold, cough, runny nose symptoms are slightly better.  Examination was unremarkable.  We will continue with supportive cares and time.    SUBJECTIVE:    Viv Rubio is a 38 y.o. female who came in today complaining of R sided flank pain and nausea.     Patient started feeling ill 2-3 weeks ago complaining of cough and nasal rhinorrhea. She has been waking up congested in the morning but is getting better now. Pt was taking cough medicine before bed each night but has not had to use any for the past 3 nights.     2 nights ago patient started to feel intermittent R flank pain and nausea. She grades the flank pain as a 2-5 out of 10 and explains that it will sometimes radiate to her RLQ or up toward her ribs. Her pain and nausea became worse as the night went on, she was feeling feverish but did not take her temperature. She struggled to sleep well but did feel slightly improved after waking up the next morning. Within 1-2 hours after waking, however, patient started to feel intense nausea and R flank pain again. This time her symptoms were accompanied by urinary frequency and urinary incontinence (when coughing or laughing). She had one episode of dysuria yesterday after which she saw a small amount of blood in her urine/toilet. Denies rash, constipation, or diarrhea. Patient does not have menstrual periods due to her IUD. She does not believe she is pregnant. Patient thinks she passed a kidney stone many years ago but states that her current pain feels much different.     Review of  Systems (except those mentioned above)  Constitutional: Negative.   HENT: Negative.   Eyes: Negative.   Respiratory: Negative.   Cardiovascular: Negative.   Gastrointestinal: Negative.   Endocrine: Negative.   Genitourinary: Negative.   Musculoskeletal: Negative.   Skin: Negative.   Allergic/Immunologic: Negative.   Neurological: Negative.   Hematological: Negative.   Psychiatric/Behavioral: Negative.     Patient Active Problem List    Diagnosis Date Noted     Thyroid nodule, 1.5x 1.3 cm right, anticipate repeat US 8/2018 10/12/2017     No Known Allergies  Current Outpatient Medications   Medication Sig Dispense Refill     hydrOXYzine HCl (ATARAX) 50 MG tablet TAKE 1 TAB AT BEDTIME AS NEEDED. 30 tablet 0     levonorgestrel (MIRENA) 20 mcg/24 hr (5 years) IUD 1 each by Intrauterine route once.       No current facility-administered medications for this visit.      Past Medical History:   Diagnosis Date     Seizures (H)     medically cleared since age 24     Thyroid nodule 10/12/2017     Past Surgical History:   Procedure Laterality Date     CHOLECYSTECTOMY  2011     RI REMOVAL GALLBLADDER      Description: Cholecystectomy;  Recorded: 10/08/2014;     US THYROID BIOPSY  12/3/2018     Social History     Socioeconomic History     Marital status:      Spouse name: None     Number of children: None     Years of education: None     Highest education level: None   Occupational History     None   Social Needs     Financial resource strain: None     Food insecurity:     Worry: None     Inability: None     Transportation needs:     Medical: None     Non-medical: None   Tobacco Use     Smoking status: Never Smoker     Smokeless tobacco: Never Used   Substance and Sexual Activity     Alcohol use: No     Drug use: No     Sexual activity: Yes     Partners: Male   Lifestyle     Physical activity:     Days per week: None     Minutes per session: None     Stress: None   Relationships     Social connections:     Talks on phone:  None     Gets together: None     Attends [employer] service: None     Active member of club or organization: None     Attends meetings of clubs or organizations: None     Relationship status: None     Intimate partner violence:     Fear of current or ex partner: None     Emotionally abused: None     Physically abused: None     Forced sexual activity: None   Other Topics Concern     None   Social History Narrative     None     Family History   Problem Relation Age of Onset     Multiple sclerosis Other         family history of      Diabetes Other      Hypertension Other      Multiple sclerosis Mother      OBJECTIVE:    Vitals:    02/25/19 1344   BP: 100/80   Pulse: 64   Weight: 131 lb 4 oz (59.5 kg)     Body mass index is 23.25 kg/m .    Physical Exam:  Constitutional: Patient was oriented to person, place, and time. Patient appeared well-developed and well-nourished. No distress.   Head: Normocephalic and atraumatic.   Right Ear: External ear normal. Normal TM  Left Ear: External ear normal. Normal TM  Nose: Nose normal.   Mouth/Throat: Oropharynx was clear and moist. No oropharyngeal exudate.   Eyes: Conjunctivae and EOM were normal. Pupils were equal, round, and reactive to light. Right eye exhibited no discharge. Left eye exhibited no discharge. No scleral icterus.   Neck: Neck supple. No JVD present. No tracheal deviation present. No thyromegaly present.   Lymphadenopathy:  Patient has no cervical adenopathy.   Cardiovascular: Normal rate, regular rhythm, normal heart sounds and intact distal pulses. No murmur heard.   Pulmonary/Chest: Effort normal and breath sounds normal. No stridor. No respiratory distress. Patient had no wheezes, no rales, exhibits no tenderness.   Abdominal: Soft. Bowel sounds were normal. Patient exhibited no distension and no mass. There was no rebound and no guarding. Suprapubic tenderness.  Neurological: Patient was alert and oriented to person, place, and time. Patient had normal  reflexes. No cranial nerve deficit. Coordination normal.   Skin: Skin was warm and dry. No rash noted. Patient was not diaphoretic. No erythema. No pallor.   Back: No tenderness to percussion of the costovertebral angle.      Results for orders placed or performed in visit on 02/25/19   Urinalysis-UC if Indicated   Result Value Ref Range    Color, UA Yellow Colorless, Yellow, Straw, Light Yellow    Clarity, UA Clear Clear    Glucose, UA Negative Negative    Bilirubin, UA Negative Negative    Ketones, UA Negative Negative    Specific Gravity, UA 1.020 1.005 - 1.030    Blood, UA Trace (!) Negative    pH, UA 6.0 5.0 - 8.0    Protein, UA Negative Negative mg/dL    Urobilinogen, UA 0.2 E.U./dL 0.2 E.U./dL, 1.0 E.U./dL    Nitrite, UA Negative Negative    Leukocytes, UA Negative Negative    Bacteria, UA Few (!) None Seen hpf    RBC, UA 0-2 None Seen, 0-2 hpf    WBC, UA 0-5 None Seen, 0-5 hpf    Squam Epithel, UA 10-25 (!) None Seen, 0-5 lpf     ADDITIONAL HISTORY SUMMARIZED (2): None.   DECISION TO OBTAIN EXTRA INFORMATION (1): None.   RADIOLOGY TESTS (1): None.  LABS (1): Labs ordered today.   MEDICINE TESTS (1): None.  INDEPENDENT REVIEW (2 each): None.     Total data points = 1    I spent a total time of 12 minutes additional to the preventive, greater than 50% counseling and coordinating care regarding right sided flank pain and nausea.    By signing my name below, I, Belkis Marrufo, attest that this documentation has been prepared under the direction and in the presence of Dr. Candice Cevallos.  Electronic Signature: Rocío Delgado. 02/25/2019 14:11.    I, Dr. Alexis Mixon MD , personally performed the services described in this documentation. All medical record entries made by the scribe were at my direction and in my presence. I have reviewed the chart and discharge instructions (if applicable) and agree that the record reflects my personal performance and is accurate and complete.

## 2021-06-24 NOTE — TELEPHONE ENCOUNTER
"Crummy cold for 2+ weeks, but then Saturday she started getting severe nausea with back pain that wraps around the right side of her body. Pain is intermittent and usually lasts 30 min to an hour, \"like it turns off and on\". Feels flushed and like it is \"hot and cold\". Loss of appetite    Rates it 1/10 at rest 5-6/10 when it flares    Radiates around the side or up spine, or down into groin, right below ribs on right side    Saturday night had difficulty breathing, change of position helped. Has not had the pain again since.    Urinary frequency, but no burning or pain.     Has increased fluid intake to try to rid self of the cold bug.     No fever  No numbness, tingling, weakness  Denies pregnancy      Reason for Disposition    Unexplained nausea    Patient wants to be seen    Protocols used: NAUSEA-A-AH, BACK PAIN-A-OH    Triaged to a disposition of See in office today or tomorrow. Call transferred to scheduling for appointment.     Ruth Silvestre RN Triage Nurse Advisor [facility]      "

## 2021-06-25 NOTE — PROGRESS NOTES
Assessment/Plan:        Diagnoses and all orders for this visit:    Urinary frequency  -     Urinalysis-UC if Indicated  -     Culture, Urine  -     amoxicillin-clavulanate (AUGMENTIN) 875-125 mg per tablet; Take 1 tablet by mouth 2 (two) times a day for 10 days.  Dispense: 20 tablet; Refill: 0    Post-nasal drip  -     amoxicillin-clavulanate (AUGMENTIN) 875-125 mg per tablet; Take 1 tablet by mouth 2 (two) times a day for 10 days.  Dispense: 20 tablet; Refill: 0  I did review the previous records as well as the urine culture which did show enterococcus faecalis.  She was treated quite well using nitrofurantoin, but unless if this is different infection because the urinalysis was negative.  Will still have a culture of the urine.  Suggested some positive findings including suprapubic discomfort and right flank pain I think it is reasonable to treat taken at this point with amoxicillin 12 mg food.  She also does have some postnasal drip and some nighttime cough hopefully this will cover for that also.  If there is any change in the bacterial growth, we will change antibiotics if needed.  She will follow-up as needed.        Subjective:    Patient ID: Viv Rubio is a 38 y.o. female.    Comes in today complaining of continuing frequency of urination as well as bladder pain.  I noted that she is still feeling not good.  Still has some right-sided flank pain.  But she denied having any fevers or chills and does not have any vomiting but does have some nausea.  She is been having the symptoms late February and had presented to the clinic for upper respiratory tract symptoms as well as urinary frequency.  A culture of the urine did show Enterococcus faecalis.  She was treated with nitrofurantoin.  She noted that she continues to have some frequency, yesterday she did have more episode of bladder discomfort as well as urinary frequency but noted that today there is an improvement.  She denied any vaginal discharge.   She did note that the urinary tract infection has started following sexual intercourse with her .  No prior STD.  She does have still some upper respiratory tract issues noting that she is still having some cough initially when she is laying down at night.  Denied any chest pains no fevers or chills.        The following portions of the patient's history were reviewed and updated as appropriate: allergies, current medications, past family history, past medical history, past social history, past surgical history and problem list.    Review of Systems   Constitutional: Negative for chills, fatigue and fever.   HENT: Positive for postnasal drip. Negative for rhinorrhea, sinus pressure and sinus pain.    Respiratory: Negative for cough and chest tightness.    Gastrointestinal: Positive for abdominal pain and nausea. Negative for abdominal distention.   Genitourinary: Positive for frequency. Negative for dysuria and vaginal discharge.   Neurological: Negative for light-headedness and headaches.     Vitals:    03/18/19 1557   BP: 110/80   Pulse: 76   Resp: 16   Temp: 98.2  F (36.8  C)   TempSrc: Oral   Weight: 132 lb 3 oz (60 kg)               Objective:    Physical Exam   Constitutional: She appears well-developed and well-nourished. No distress.   HENT:   Right Ear: Tympanic membrane normal.   Left Ear: Tympanic membrane normal.   Nose: Right sinus exhibits no maxillary sinus tenderness and no frontal sinus tenderness. Left sinus exhibits no maxillary sinus tenderness and no frontal sinus tenderness.   Mouth/Throat: No posterior oropharyngeal edema or posterior oropharyngeal erythema.   Some postnasal drip.   Cardiovascular: Normal rate and regular rhythm.   Pulmonary/Chest: Effort normal and breath sounds normal.   Abdominal: Soft. Normal appearance. There is tenderness in the suprapubic area.   Mild discomfort to the right flank.   Musculoskeletal: Normal range of motion.   Lymphadenopathy:     She has no  cervical adenopathy.

## 2021-06-28 NOTE — PROGRESS NOTES
Progress Notes by Alexis Contreras MD at 12/9/2019  7:40 AM     Author: Alexis Contreras MD Service: -- Author Type: Physician    Filed: 12/9/2019  8:57 AM Encounter Date: 12/9/2019 Status: Signed    : Alexis Contreras MD (Physician)       FEMALE PREVENTATIVE EXAM    Assessment and Plan:     Routine general medical examination at a health care facility  -     Lipid Cascade  -     Glucose  -     Thyroid Cascade  We discussed healthy lifestyle, nutrition, cardiovascular risk reduction, self care, safety, sunscreen, seatbelt, and timing of cancer screening.  Health maintenance screening and immunizations reviewed with the patient.    Insomnia, anxiety  Takes prn  refilled  -     hydrOXYzine HCl (ATARAX) 50 MG tablet; TAKE 1 TAB AT BEDTIME AS NEEDED    Next follow up:  Return in about 1 year (around 12/9/2020).    Immunization Review  Adult Imm Review: No immunizations due today    I discussed the following with the patient:   Adult Healthy Living: Importance of regular exercise  Healthy nutrition  Getting adequate sleep  Stress management  Supplement use    Subjective:   Chief Complaint: Viv Rubio is an 38 y.o. female here for a preventative health visit.     HPI:    Anxiety: She takes hydroxyzine about twice a month. She only takes it when she has two days in a row where she cannot sleep. It has helped her significantly to be able to silence her mind and sleep. She has not taken it the past few months. She inquires about a refill for the hydroxyzine.     Health Maintenance: She is fasting this morning. She had a normal pap smear in 2017. She had an influenza vaccination through work in October. She has a Mirena in place right now which has been working well for her. She believes that she had the Mirena placed about 2 years ago by her gynecologist. Her  is getting a vasectomy in about a week. She is thinking of removing the Mirena when it is due rather than  "earlier.     Review of Systems: Please see above. The rest of the review of systems are negative for all systems.     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 Her work has been pretty stable. She has had the same job for 5 years and finally feels like she has a handle on it.  Her sister-in-law is getting treatment for breast cancer.     Healthy Habits  Are you taking a daily aspirin? No  Do you typically exercising at least 40 min, 3-4 times per week?  Yes  Do you usually eat at least 4 servings of fruit and vegetables a day, include whole grains and fiber and avoid regularly eating high fat foods? Yes  Have you had an eye exam in the past two years? Yes  Do you see a dentist twice per year? Yes  Do you have any concerns regarding sleep? No    Safety Screen  If you own firearms, are they secured in a locked gun cabinet or with trigger locks? The patient does not own any firearms  Do you feel you are safe where you are living?: Yes (3/18/2019  3:57 PM)  Do you feel you are safe in your relationship(s)?: Yes (3/18/2019  3:57 PM)      Pap History:   No - age 30-65 PAP every 3 years recommended  Cancer Screening       Status Date      PAP SMEAR Next Due 10/12/2022      Done 10/12/2017 GYNECOLOGIC CYTOLOGY (PAP SMEAR)     Patient has more history with this topic...          Patient Care Team:  Alexis Contreras MD as PCP - General (Family Medicine)  Alexis Contreras MD as Assigned PCP        History     Reviewed By Date/Time Sections Reviewed    Gloria Minaya CMA 12/9/2019  7:39 AM Tobacco            Objective:   Vital Signs:   Visit Vitals  /66 (Patient Site: Left Arm, Patient Position: Sitting, Cuff Size: Adult Regular)   Pulse 76   Ht 5' 3\" (1.6 m)   Wt 130 lb 5 oz (59.1 kg)   BMI 23.08 kg/m           PHYSICAL EXAM  Constitutional: Patient is oriented to person, place, and time. Patient appears well-developed and well-nourished. No distress.   Head: " Normocephalic and atraumatic.   Right Ear: External ear normal.   Left Ear: External ear normal.   Nose: Nose normal.   Mouth/Throat: Oropharynx is clear and moist. No oropharyngeal exudate.   Eyes: Conjunctivae and EOM are normal. Pupils are equal, round, and reactive to light. Right eye exhibits no discharge. Left eye exhibits no discharge. No scleral icterus.   Neck: Neck supple. No JVD present. No tracheal deviation present. No thyromegaly present.   Breasts:  Normal appearing, no skin involvement, no palpable mass, no tenderness on palpation.  No axillary involvement  Cardiovascular: Normal rate, regular rhythm, normal heart sounds and intact distal pulses.   No murmur heard.   Pulmonary/Chest: Effort normal and breath sounds normal. No stridor. No respiratory distress. Patient has no wheezes, no rales, exhibits no tenderness.   Abdominal: Soft. Bowel sounds are normal. Patient exhibits no distension and no mass. There is no tenderness. There is no rebound and no guarding.   Lymphadenopathy:  Patient has no cervical adenopathy.   Neurological: Patient is alert and oriented to person, place, and time. Patient has normal reflexes. No cranial nerve deficit. Coordination normal.   Skin: Skin is warm and dry. No rash noted. Patient is not diaphoretic. No erythema. No pallor.   Pelvic:  Normal external genitalia with Normal vulva.  Normal vagina with no polyps or lesions and with physiologic discharge.  Normal cervix with normal mucosa and without CMT.  No adnexal masses. Mirena strings visualized.   Psychiatric: Patient has good eye contact without any psychomotor retardation or stereotypic behaviors.  normal mood and affect. Judgment and thought content normal.   Speech is regular rate and rhythm.     ADDITIONAL HISTORY SUMMARIZED (2): Reviewed note from 11/18/19 about urinary frequency.   DECISION TO OBTAIN EXTRA INFORMATION (1): None.   RADIOLOGY TESTS (1): Reviewed US Thyroid Biopsy from 12/03/18 which was  normal.   LABS (1): Reviewed labs from 10/30/18 and ordered labs today.   MEDICINE TESTS (1): None.  INDEPENDENT REVIEW (2 each): None.     The visit lasted a total of 12 minutes face to face with the patient. Over 50% of the time was spent counseling and educating the patient about overall wellness.    I, Heather Alejo, am scribing for and in the presence of, Dr. Cevallos.    I, Dr. Cevallos, personally performed the services described in this documentation, as scribed by Heather Alejo in my presence, and it is both accurate and complete.    Total data points: 4       Medication List          Accurate as of December 9, 2019  8:56 AM. If you have any questions, ask your nurse or doctor.            CONTINUE taking these medications    hydrOXYzine HCl 50 MG tablet  Also known as:  ATARAX  INSTRUCTIONS:  TAKE 1 TAB AT BEDTIME AS NEEDED        Mirena 20 mcg/24 hours (5 yrs) 52 mg IUD  INSTRUCTIONS:  1 each by Intrauterine route once.  Generic drug:  levonorgestrel              Where to Get Your Medications      These medications were sent to [place] [id number] [place] [address] [employer] [address] [address]    Phone:  [phone]     hydrOXYzine HCl 50 MG tablet         Additional Screenings Completed Today:

## 2021-07-28 DIAGNOSIS — Z23 ENCOUNTER FOR ADMINISTRATION OF VACCINE: Primary | ICD-10-CM

## 2021-08-02 ENCOUNTER — ALLIED HEALTH/NURSE VISIT (OUTPATIENT)
Dept: FAMILY MEDICINE | Facility: CLINIC | Age: 41
End: 2021-08-02
Payer: COMMERCIAL

## 2021-08-02 DIAGNOSIS — Z23 ENCOUNTER FOR ADMINISTRATION OF VACCINE: ICD-10-CM

## 2021-08-02 PROCEDURE — 99207 PR NO CHARGE NURSE ONLY: CPT

## 2021-08-02 PROCEDURE — 90714 TD VACC NO PRESV 7 YRS+ IM: CPT

## 2021-08-02 PROCEDURE — 90471 IMMUNIZATION ADMIN: CPT

## 2021-09-25 ENCOUNTER — HEALTH MAINTENANCE LETTER (OUTPATIENT)
Age: 41
End: 2021-09-25

## 2021-10-06 ENCOUNTER — TRANSFERRED RECORDS (OUTPATIENT)
Dept: HEALTH INFORMATION MANAGEMENT | Facility: CLINIC | Age: 41
End: 2021-10-06

## 2022-06-09 ENCOUNTER — TRANSFERRED RECORDS (OUTPATIENT)
Dept: HEALTH INFORMATION MANAGEMENT | Facility: CLINIC | Age: 42
End: 2022-06-09
Payer: COMMERCIAL

## 2022-07-02 ENCOUNTER — HEALTH MAINTENANCE LETTER (OUTPATIENT)
Age: 42
End: 2022-07-02

## 2022-08-08 ENCOUNTER — OFFICE VISIT (OUTPATIENT)
Dept: FAMILY MEDICINE | Facility: CLINIC | Age: 42
End: 2022-08-08
Payer: COMMERCIAL

## 2022-08-08 VITALS
DIASTOLIC BLOOD PRESSURE: 68 MMHG | BODY MASS INDEX: 24.98 KG/M2 | OXYGEN SATURATION: 97 % | SYSTOLIC BLOOD PRESSURE: 100 MMHG | WEIGHT: 141 LBS | HEIGHT: 63 IN | HEART RATE: 76 BPM

## 2022-08-08 DIAGNOSIS — Z00.00 ROUTINE ADULT HEALTH MAINTENANCE: Primary | ICD-10-CM

## 2022-08-08 DIAGNOSIS — L71.9 ROSACEA: ICD-10-CM

## 2022-08-08 DIAGNOSIS — E04.1 THYROID NODULE: ICD-10-CM

## 2022-08-08 DIAGNOSIS — R06.83 SNORING: ICD-10-CM

## 2022-08-08 LAB
CHOLEST SERPL-MCNC: 180 MG/DL
FASTING STATUS PATIENT QL REPORTED: YES
GLUCOSE SERPL-MCNC: 90 MG/DL (ref 70–99)
HDLC SERPL-MCNC: 49 MG/DL
LDLC SERPL CALC-MCNC: 112 MG/DL
NONHDLC SERPL-MCNC: 131 MG/DL
T3FREE SERPL-MCNC: 2.7 PG/ML (ref 2–4.4)
T4 FREE SERPL-MCNC: 1.06 NG/DL (ref 0.9–1.7)
TRIGL SERPL-MCNC: 93 MG/DL
TSH SERPL DL<=0.005 MIU/L-ACNC: 3.06 UIU/ML (ref 0.3–4.2)

## 2022-08-08 PROCEDURE — 84443 ASSAY THYROID STIM HORMONE: CPT | Performed by: FAMILY MEDICINE

## 2022-08-08 PROCEDURE — 82947 ASSAY GLUCOSE BLOOD QUANT: CPT | Performed by: FAMILY MEDICINE

## 2022-08-08 PROCEDURE — 80061 LIPID PANEL: CPT | Performed by: FAMILY MEDICINE

## 2022-08-08 PROCEDURE — 84481 FREE ASSAY (FT-3): CPT | Performed by: FAMILY MEDICINE

## 2022-08-08 PROCEDURE — 87624 HPV HI-RISK TYP POOLED RSLT: CPT | Performed by: FAMILY MEDICINE

## 2022-08-08 PROCEDURE — 99213 OFFICE O/P EST LOW 20 MIN: CPT | Mod: 25 | Performed by: FAMILY MEDICINE

## 2022-08-08 PROCEDURE — 84439 ASSAY OF FREE THYROXINE: CPT | Performed by: FAMILY MEDICINE

## 2022-08-08 PROCEDURE — 99396 PREV VISIT EST AGE 40-64: CPT | Performed by: FAMILY MEDICINE

## 2022-08-08 PROCEDURE — 36415 COLL VENOUS BLD VENIPUNCTURE: CPT | Performed by: FAMILY MEDICINE

## 2022-08-08 PROCEDURE — G0145 SCR C/V CYTO,THINLAYER,RESCR: HCPCS | Performed by: FAMILY MEDICINE

## 2022-08-08 RX ORDER — DOXYCYCLINE 100 MG/1
CAPSULE ORAL
COMMUNITY
Start: 2022-07-08 | End: 2022-12-07

## 2022-08-08 ASSESSMENT — ENCOUNTER SYMPTOMS
ABDOMINAL PAIN: 0
FREQUENCY: 0
DYSURIA: 0
HEMATURIA: 0
NAUSEA: 0
DIARRHEA: 0
WEAKNESS: 0
CONSTIPATION: 0
BREAST MASS: 0
JOINT SWELLING: 0
EYE PAIN: 0
CHILLS: 0
FEVER: 0
PALPITATIONS: 0
PARESTHESIAS: 0
HEARTBURN: 0
NERVOUS/ANXIOUS: 0
SORE THROAT: 0
HEMATOCHEZIA: 0
DIZZINESS: 0
COUGH: 0
ARTHRALGIAS: 0
HEADACHES: 0
SHORTNESS OF BREATH: 0
MYALGIAS: 0

## 2022-08-08 NOTE — PROGRESS NOTES
SUBJECTIVE:   CC: Viv Rubio is an 41 year old woman who presents for preventive health visit.       Patient has been advised of split billing requirements and indicates understanding: Yes  Healthy Habits:     Getting at least 3 servings of Calcium per day:  Yes    Bi-annual eye exam:  Yes    Dental care twice a year:  Yes    Sleep apnea or symptoms of sleep apnea:  None    Diet:  Regular (no restrictions)    Frequency of exercise:  4-5 days/week    Duration of exercise:  30-45 minutes    Taking medications regularly:  Yes    Medication side effects:  None    PHQ-2 Total Score: 0    Additional concerns today:  Yes    Ability to successfully perform activities of daily living: Yes, no assistance needed  Home safety:  none identified     Today's PHQ-2 Score:   PHQ-2 ( 1999 [employer]) 8/8/2022   Q1: Little interest or pleasure in doing things 0   Q2: Feeling down, depressed or hopeless 0   PHQ-2 Score 0   Q1: Little interest or pleasure in doing things Not at all   Q2: Feeling down, depressed or hopeless Not at all   PHQ-2 Score 0       Abuse: Current or Past (Physical, Sexual or Emotional) - No  Do you feel safe in your environment? Yes    Have you ever done Advance Care Planning? (For example, a Health Directive, POLST, or a discussion with a medical provider or your loved ones about your wishes): Yes, patient states has an Advance Care Planning document and will bring a copy to the clinic.    Social History     Tobacco Use     Smoking status: Never Smoker     Smokeless tobacco: Never Used   Substance Use Topics     Alcohol use: No     Alcohol Use 8/8/2022   Prescreen: >3 drinks/day or >7 drinks/week? No     Reviewed orders with patient.  Reviewed health maintenance and updated orders accordingly - Yes  Lab work is in process    Breast Cancer Screening:    Breast CA Risk Assessment (FHS-7) 8/8/2022   Do you have a family history of breast, colon, or ovarian cancer? No / Unknown       click delete button to  remove this line now  Mammogram Screening - Offered annual screening and updated Health Maintenance for mutual plan based on risk factor consideration    Pertinent mammograms are reviewed under the imaging tab.    History of abnormal Pap smear: NO - age 30-65 PAP every 5 years with negative HPV co-testing recommended  PAP / HPV 10/12/2017   PAP Negative for squamous intraepithelial lesion or malignancy  Electronically signed by Nat Harper CT (ASCP) on 10/17/2017 at  3:13 PM       Reviewed and updated as needed this visit by clinical staff   Tobacco  Allergies  Meds  Problems               Reviewed and updated as needed this visit by Provider     Meds  Problems              Past Medical History:   Diagnosis Date     Displacement of lumbar intervertebral disc without myelopathy 10/14/2008     Nonallopathic lesion of lumbar region, not elsewhere classified 10/14/2008     Seizures (H)     medically cleared since age 24     Thyroid nodule 10/12/2017      Past Surgical History:   Procedure Laterality Date     CHOLECYSTECTOMY  2011     HC REMOVAL GALLBLADDER      Description: Cholecystectomy;  Recorded: 10/08/2014;     US THYROID BIOPSY  12/3/2018       Review of Systems   Constitutional: Negative for chills and fever.   HENT: Negative for congestion, ear pain, hearing loss and sore throat.    Eyes: Negative for pain and visual disturbance.   Respiratory: Negative for cough and shortness of breath.    Cardiovascular: Negative for chest pain, palpitations and peripheral edema.   Gastrointestinal: Negative for abdominal pain, constipation, diarrhea, heartburn, hematochezia and nausea.   Breasts:  Negative for tenderness, breast mass and discharge.   Genitourinary: Negative for dysuria, frequency, genital sores, hematuria, pelvic pain, urgency, vaginal bleeding and vaginal discharge.   Musculoskeletal: Negative for arthralgias, joint swelling and myalgias.   Skin: Negative for rash.   Neurological: Negative  "for dizziness, weakness, headaches and paresthesias.   Psychiatric/Behavioral: Negative for mood changes. The patient is not nervous/anxious.         OBJECTIVE:   /68   Pulse 76   Ht 1.6 m (5' 3\")   Wt 64 kg (141 lb)   LMP 07/21/2022   SpO2 97%   BMI 24.98 kg/m    Physical Exam  GENERAL: healthy, alert and no distress  EYES: Eyes grossly normal to inspection, PERRL and conjunctivae and sclerae normal  HENT: ear canals and TM's normal  NECK: no adenopathy, no asymmetry, masses, or scars and thyroid normal to palpation  RESP: lungs clear to auscultation - no rales, rhonchi or wheezes  BREAST: normal without masses, tenderness or nipple discharge and no palpable axillary masses or adenopathy  CV: regular rate and rhythm, normal S1 S2, no S3 or S4, no murmur, click or rub, no peripheral edema and peripheral pulses strong  ABDOMEN: soft, nontender, no hepatosplenomegaly, no masses and bowel sounds normal  MS: no gross musculoskeletal defects noted, no edema  SKIN: no suspicious lesions or rashes  NEURO: Normal strength and tone, mentation intact and speech normal  PSYCH: mentation appears normal, affect normal/bright  Pelvic:  Normal external genitalia with Normal vulva.  Normal vagina with no polyps or lesions and with physiologic discharge.  Normal cervix with normal mucosa and without CMT.  No adnexal masses     Diagnostic Test Results:  Labs reviewed in Epic    ASSESSMENT/PLAN:   Viv was seen today for physical.    Diagnoses and all orders for this visit:    Routine adult health maintenance  -     Lipid Profile (Chol, Trig, HDL, LDL calc); Future  -     Glucose; Future  -     *MA Screening Digital Bilateral; Future  -     Pap screen with HPV - recommended age 30 - 65 years  We discussed healthy lifestyle, nutrition, cardiovascular risk reduction, self care, safety, sunscreen, and timing of cancer screening.  Health maintenance screening and immunizations reviewed with the patient.  Follow up yearly for " "the annual physical.    Snoring  -     Adult Sleep Eval & Management  Referral; Future    STOP BANG screen  Snore: y  Tired: y  Observe apnea: n   Pressure, blood: n   BMI >35: n  Age >50: n  Neck size large: n  Gender, male: n  Score:  2 (low risk of MARK)    Thyroid nodule  1.7 cm right thyroid nodule that was biopsied in 2018 with benign cells  Thyroid ultrasound for surveillance  -     US Thyroid; Future  -     TSH; Future  -     T4 free; Future  -     T3 Free; Future    Rosacea  Doxycycline per dermatology      COUNSELING:  Reviewed preventive health counseling, as reflected in patient instructions    Estimated body mass index is 24.98 kg/m  as calculated from the following:    Height as of this encounter: 1.6 m (5' 3\").    Weight as of this encounter: 64 kg (141 lb).        She reports that she has never smoked. She has never used smokeless tobacco.      Counseling Resources:  ATP IV Guidelines  Pooled Cohorts Equation Calculator  Breast Cancer Risk Calculator  BRCA-Related Cancer Risk Assessment: FHS-7 Tool  FRAX Risk Assessment  ICSI Preventive Guidelines  Dietary Guidelines for Americans, 2010  USDA's MyPlate  ASA Prophylaxis  Lung CA Screening    Alexis Mixon MD  [facility]  "

## 2022-08-11 LAB
BKR LAB AP GYN ADEQUACY: NORMAL
BKR LAB AP GYN INTERPRETATION: NORMAL
BKR LAB AP HPV REFLEX: NORMAL
BKR LAB AP LMP: NORMAL
BKR LAB AP PREVIOUS ABNORMAL: NORMAL
PATH REPORT.COMMENTS IMP SPEC: NORMAL
PATH REPORT.COMMENTS IMP SPEC: NORMAL
PATH REPORT.RELEVANT HX SPEC: NORMAL

## 2022-08-16 LAB
HUMAN PAPILLOMA VIRUS 16 DNA: NEGATIVE
HUMAN PAPILLOMA VIRUS 18 DNA: NEGATIVE
HUMAN PAPILLOMA VIRUS FINAL DIAGNOSIS: NORMAL
HUMAN PAPILLOMA VIRUS OTHER HR: NEGATIVE

## 2022-09-23 ENCOUNTER — HOSPITAL ENCOUNTER (OUTPATIENT)
Dept: ULTRASOUND IMAGING | Facility: CLINIC | Age: 42
Discharge: HOME | End: 2022-09-23
Attending: FAMILY MEDICINE
Payer: COMMERCIAL

## 2022-09-23 ENCOUNTER — HOSPITAL ENCOUNTER (OUTPATIENT)
Dept: MAMMOGRAPHY | Facility: CLINIC | Age: 42
Discharge: HOME | End: 2022-09-23
Attending: FAMILY MEDICINE
Payer: COMMERCIAL

## 2022-09-23 DIAGNOSIS — Z00.00 ROUTINE ADULT HEALTH MAINTENANCE: ICD-10-CM

## 2022-09-23 DIAGNOSIS — E04.1 THYROID NODULE: ICD-10-CM

## 2022-09-23 PROCEDURE — 77067 SCR MAMMO BI INCL CAD: CPT

## 2022-09-23 PROCEDURE — 76536 US EXAM OF HEAD AND NECK: CPT

## 2022-12-05 ASSESSMENT — SLEEP AND FATIGUE QUESTIONNAIRES
HOW LIKELY ARE YOU TO NOD OFF OR FALL ASLEEP WHILE SITTING AND READING: MODERATE CHANCE OF DOZING
HOW LIKELY ARE YOU TO NOD OFF OR FALL ASLEEP WHILE SITTING QUIETLY AFTER LUNCH WITHOUT ALCOHOL: WOULD NEVER DOZE
HOW LIKELY ARE YOU TO NOD OFF OR FALL ASLEEP WHILE LYING DOWN TO REST IN THE AFTERNOON WHEN CIRCUMSTANCES PERMIT: HIGH CHANCE OF DOZING
HOW LIKELY ARE YOU TO NOD OFF OR FALL ASLEEP WHILE SITTING INACTIVE IN A PUBLIC PLACE: SLIGHT CHANCE OF DOZING
HOW LIKELY ARE YOU TO NOD OFF OR FALL ASLEEP WHILE SITTING AND TALKING TO SOMEONE: WOULD NEVER DOZE
HOW LIKELY ARE YOU TO NOD OFF OR FALL ASLEEP WHILE WATCHING TV: HIGH CHANCE OF DOZING
HOW LIKELY ARE YOU TO NOD OFF OR FALL ASLEEP IN A CAR, WHILE STOPPED FOR A FEW MINUTES IN TRAFFIC: WOULD NEVER DOZE
HOW LIKELY ARE YOU TO NOD OFF OR FALL ASLEEP WHEN YOU ARE A PASSENGER IN A CAR FOR AN HOUR WITHOUT A BREAK: HIGH CHANCE OF DOZING

## 2022-12-07 ENCOUNTER — OFFICE VISIT (OUTPATIENT)
Dept: SLEEP MEDICINE | Facility: CLINIC | Age: 42
End: 2022-12-07
Payer: COMMERCIAL

## 2022-12-07 VITALS
HEIGHT: 63 IN | OXYGEN SATURATION: 96 % | HEART RATE: 88 BPM | WEIGHT: 143 LBS | DIASTOLIC BLOOD PRESSURE: 78 MMHG | SYSTOLIC BLOOD PRESSURE: 119 MMHG | BODY MASS INDEX: 25.34 KG/M2

## 2022-12-07 DIAGNOSIS — R40.0 SLEEPINESS: ICD-10-CM

## 2022-12-07 DIAGNOSIS — R06.83 SNORING: ICD-10-CM

## 2022-12-07 DIAGNOSIS — R29.818 SUSPECTED SLEEP APNEA: Primary | ICD-10-CM

## 2022-12-07 PROCEDURE — 99204 OFFICE O/P NEW MOD 45 MIN: CPT | Performed by: INTERNAL MEDICINE

## 2022-12-07 RX ORDER — METRONIDAZOLE 7.5 MG/G
GEL TOPICAL 2 TIMES DAILY
COMMUNITY

## 2022-12-07 NOTE — PROGRESS NOTES
Sleep Consultation:    Date on this visit: 12/7/2022    Viv Rubio  is referred by Alexis Minor* for a sleep consultation.     Primary Physician: Ban Mixon, Alexis Harvey     Viv Rubio reports nightly snoring and poor quality of sleep for many years.     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  She lives with her  and 2 children.     Viv does snore every night.  Snoring causes disruption to her 's sleep.  He tends to wake her up to reduce her snoring, and then she will have difficulty returning to sleep.  She has experienced snort arousals.  She does not have witnessed apneas.They never sleep separately.  Patient sleeps on her back and side. She has frequent morning dry mouth, denies no morning headaches and restless legs. Viv denies any sleep walking, dream enactment, sleep paralysis and hypnogogic/hypnopompic hallucinations. She has sleep related bruxism.     Viv goes to sleep at 10:30 PM during the week.  She has a history of difficulty falling asleep.  After working it with a therapist, she is found ways to address her insomnia.  She wakes up at 6:00 AM. She wakes up 1-2 times a night for 30 minutes before falling back to sleep.  Viv wakes up to uncertain reasons and external stimuli.  On weekends, Viv goes to sleep at 11:00 PM.  She wakes up at 7:00 AM. She falls asleep in 20 minutes.      Viv denies difficulty breathing through her nose.      Patient's [place] Sleepiness score 12/24 consistent with mild daytime sleepiness.      Viv naps 0 times per week. She takes no inadvertant naps.  She denies closing eyes, dozing and falling asleep while driving. Patient was counseled on the importance of driving while alert, to pull over if drowsy, or nap before getting into the vehicle if sleepy.      She uses 1 cups/day of tea. Last caffeine intake is usually before noon.    Past  "Medical/Surgical History:  Past Medical History:   Diagnosis Date     Displacement of lumbar intervertebral disc without myelopathy 10/14/2008     Nonallopathic lesion of lumbar region, not elsewhere classified 10/14/2008     Seizures (H)     medically cleared since age 24     Thyroid nodule 10/12/2017     Social History     Tobacco Use     Smoking status: Never     Smokeless tobacco: Never   Substance Use Topics     Alcohol use: No     Drug use: No       Family History:  Family History   Problem Relation Age of Onset     Multiple Sclerosis Other         family history of      Diabetes Other      Hypertension Other      Multiple Sclerosis Mother      Physical Examination:  Vitals: /78   Pulse 88   Ht 1.6 m (5' 3\")   Wt 64.9 kg (143 lb)   SpO2 96%   BMI 25.33 kg/m    BMI= Body mass index is 25.33 kg/m .  GENERAL APPEARANCE: healthy, alert and no distress  HENT: oropharynx crowded  NEURO: mentation intact and speech normal  PSYCH: mentation appears normal and affect normal/bright  Mallampati Class: IV.  Tonsillar Stage: 1  hidden by pillars.    Impression/Plan:    1.  To rule out obstructive sleep apnea    Patient is a 41 years old female with a BMI of 25, who presents with a history of disruptive snoring, frequent nocturnal arousals, non restorative sleep and daytime sleepiness.  She has a Mallampati class IV airway on examination.  There is intermediate risk for obstructive sleep apnea and an overnight sleep study is recommended for further evaluation.     If sleep testing is negative, patient would still want to consider a dental device to manage her snoring.    Plan:     1. Home sleep apnea testing       She will follow up with me in approximately two weeks after her sleep study has been competed to review the results and discuss plan of care.       Polysomnography & HST reviewed.  Obstructive sleep apnea reviewed.  Complications of untreated sleep apnea were reviewed.    I spent a total of 45 minutes " for this appointment on this date of service which include time spent before, during and after the visit for chart review, patient care, counseling and coordination of care.    Dr. Issac Alejandro     CC: Alexis Minor*

## 2022-12-07 NOTE — NURSING NOTE
"Chief Complaint   Patient presents with     Sleep Problem     Snoring for a couple years and dont sleep well        Initial /78   Pulse 88   Ht 1.6 m (5' 3\")   Wt 64.9 kg (143 lb)   SpO2 96%   BMI 25.33 kg/m   Estimated body mass index is 25.33 kg/m  as calculated from the following:    Height as of this encounter: 1.6 m (5' 3\").    Weight as of this encounter: 64.9 kg (143 lb).    Medication Reconciliation: complete  ESS: 12  Neck circumference: 33cm    Monie Black CNA  "

## 2022-12-27 ENCOUNTER — TELEPHONE (OUTPATIENT)
Dept: FAMILY MEDICINE | Facility: CLINIC | Age: 42
End: 2022-12-27

## 2022-12-27 NOTE — TELEPHONE ENCOUNTER
Left voicemail for patient to return call to clinic. When patient returns call, please give them below message.    Patient was scheduled in error with Dr. Cuadra for an Establish Care. When patient returns call, please help her reschedule with someone who is accepting patient's.     FYI, we will not be able to establish and adult patient's at [facility] for awhile due to family medicine moving to [place].

## 2023-01-30 ASSESSMENT — SLEEP AND FATIGUE QUESTIONNAIRES
HOW LIKELY ARE YOU TO NOD OFF OR FALL ASLEEP WHILE WATCHING TV: MODERATE CHANCE OF DOZING
HOW LIKELY ARE YOU TO NOD OFF OR FALL ASLEEP WHILE SITTING AND TALKING TO SOMEONE: WOULD NEVER DOZE
HOW LIKELY ARE YOU TO NOD OFF OR FALL ASLEEP WHILE SITTING AND READING: WOULD NEVER DOZE
HOW LIKELY ARE YOU TO NOD OFF OR FALL ASLEEP WHILE SITTING QUIETLY AFTER LUNCH WITHOUT ALCOHOL: SLIGHT CHANCE OF DOZING
HOW LIKELY ARE YOU TO NOD OFF OR FALL ASLEEP WHILE LYING DOWN TO REST IN THE AFTERNOON WHEN CIRCUMSTANCES PERMIT: HIGH CHANCE OF DOZING
HOW LIKELY ARE YOU TO NOD OFF OR FALL ASLEEP IN A CAR, WHILE STOPPED FOR A FEW MINUTES IN TRAFFIC: WOULD NEVER DOZE
HOW LIKELY ARE YOU TO NOD OFF OR FALL ASLEEP WHEN YOU ARE A PASSENGER IN A CAR FOR AN HOUR WITHOUT A BREAK: MODERATE CHANCE OF DOZING
HOW LIKELY ARE YOU TO NOD OFF OR FALL ASLEEP WHILE SITTING INACTIVE IN A PUBLIC PLACE: SLIGHT CHANCE OF DOZING

## 2023-01-31 ENCOUNTER — OFFICE VISIT (OUTPATIENT)
Dept: SLEEP MEDICINE | Facility: CLINIC | Age: 43
End: 2023-01-31
Attending: INTERNAL MEDICINE
Payer: COMMERCIAL

## 2023-01-31 DIAGNOSIS — R40.0 SLEEPINESS: ICD-10-CM

## 2023-01-31 DIAGNOSIS — R29.818 SUSPECTED SLEEP APNEA: ICD-10-CM

## 2023-01-31 DIAGNOSIS — R06.83 SNORING: ICD-10-CM

## 2023-01-31 PROCEDURE — G0399 HOME SLEEP TEST/TYPE 3 PORTA: HCPCS | Performed by: INTERNAL MEDICINE

## 2023-01-31 NOTE — PROGRESS NOTES
Pt is completing a home sleep test. Pt was instructed on how to put on the Noxturnal T3 device and associated equipment before going to bed and given the opportunity to practice putting it on before leaving the sleep center. Pt was reminded to bring the home sleep test kit back to the center tomorrow, at agreed upon time for download and reporting.   Priya Johnston CMA on 1/31/2023 at 1:44 PM

## 2023-02-01 ENCOUNTER — DOCUMENTATION ONLY (OUTPATIENT)
Dept: SLEEP MEDICINE | Facility: CLINIC | Age: 43
End: 2023-02-01
Payer: COMMERCIAL

## 2023-02-01 NOTE — PROGRESS NOTES
This HSAT was performed using a Noxturnal T3 device which recorded snore, sound, movement activity, body position, nasal pressure, oronasal thermal airflow, pulse, oximetry and both chest and abdominal respiratory effort. HSAT data was restricted to the time patient states they were in bed.     HSAT was scored using 1B 4% hypopnea rule.     HST AHI (Non-PAT): 0  Snoring was reported as mild and very rare.  Time with SpO2 below 89% was 0 minutes.   Overall signal quality was good.     Pt will follow up with sleep provider to determine appropriate therapy.       HST POST-STUDY QUESTIONNAIRE    1. What time did you go to bed?  10:30 p.m.  2. How long do you think it took to fall asleep?  15 min  3. What time did you wake up to start the day?  5:00   4. Did you get up during the night at all?  no  5. If you woke up, do you remember approximately what time(s)? Woke up about 3:30 when  went to the bathroom  6. Did you have any difficulty with the equipment?  No  7. Did you us any type of treatment with this study?  None  8. Was the head of the bed elevated? No  9. Did you sleep in a recliner?  No  10. Did you stop using CPAP at least 3 days before this test?  NA  11. Any other information you'd like us to know? No

## 2023-02-01 NOTE — PROCEDURES
"HOME SLEEP STUDY INTERPRETATION        Patient: Viv Rubio  MRN: [id number]  YOB: 1980  Study Date: 2023  PCP/Referring Provider: Alexis Contreras;   Ordering Provider: Issac Alejandro MD         Indications for Home Study: Viv Rubio is a 42 year old female who presents with symptoms suggestive of obstructive sleep apnea.    Estimated body mass index is 25.33 kg/m  as calculated from the following:    Height as of 22: 1.6 m (5' 3\").    Weight as of 22: 64.9 kg (143 lb).  Total score - [place]: 9 (2023  7:12 PM)  STOP-BAN/8        Data: A full night home sleep study was performed recording the standard physiologic parameters including body position, movement, sound, nasal pressure, thermal oral airflow, chest and abdominal movements with respiratory inductance plethysmography, and oxygen saturation by pulse oximetry. Pulse rate was estimated by oximetry recording. This study was considered adequate based on > 4 hours of quality oximetry and respiratory recording. As specified by the AASM Manual for the Scoring of Sleep and Associated events, version 2.3, Rule VIII.D 1B, 4% oxygen desaturation scoring for hypopneas is used as a standard of care on all home sleep apnea testing.        Analysis Time:  389.5 minutes        Respiration:   Sleep Associated Hypoxemia: sustained hypoxemia was not present. Baseline oxygen saturation was 97%.  Time with saturation less than or equal to 88% was 0 minutes. The lowest oxygen saturation was 92%.   Snoring: Snoring was present.  Respiratory events: The home study revealed a presence of 0 obstructive apneas and 0 mixed and central apneas. There were 0 hypopneas resulting in a combined apnea/hypopnea index [AHI] of 0 events per hour.  AHI was 0 per hour supine, N/A per hour prone, N/A per hour on left side, and 0 per hour on right side.   Pattern: Excluding events noted above, respiratory rate and pattern was " Normal.      Position: Percent of time spent: supine - 32.7%, prone - 0%, on left - 0%, on right - 67.3%.      Heart Rate: By pulse oximetry normal rate was noted.       Assessment:     Negative for obstructive sleep apnea.    Sleep associated hypoxemia was not present.    Recommendations:    If clinical concern for sleep apnea remains due to history or exam findings, consider in lab PSG for assessment.        Diagnosis Code(s): Snoring R06.83    Issac Alejandro MD, February 1, 2023   Diplomate, American Board of Psychiatry and Neurology, Sleep Medicine

## 2023-03-28 ASSESSMENT — SLEEP AND FATIGUE QUESTIONNAIRES
HOW LIKELY ARE YOU TO NOD OFF OR FALL ASLEEP WHILE WATCHING TV: HIGH CHANCE OF DOZING
HOW LIKELY ARE YOU TO NOD OFF OR FALL ASLEEP WHEN YOU ARE A PASSENGER IN A CAR FOR AN HOUR WITHOUT A BREAK: MODERATE CHANCE OF DOZING
HOW LIKELY ARE YOU TO NOD OFF OR FALL ASLEEP WHILE SITTING AND READING: SLIGHT CHANCE OF DOZING
HOW LIKELY ARE YOU TO NOD OFF OR FALL ASLEEP WHILE LYING DOWN TO REST IN THE AFTERNOON WHEN CIRCUMSTANCES PERMIT: HIGH CHANCE OF DOZING
HOW LIKELY ARE YOU TO NOD OFF OR FALL ASLEEP IN A CAR, WHILE STOPPED FOR A FEW MINUTES IN TRAFFIC: WOULD NEVER DOZE
HOW LIKELY ARE YOU TO NOD OFF OR FALL ASLEEP WHILE SITTING QUIETLY AFTER LUNCH WITHOUT ALCOHOL: MODERATE CHANCE OF DOZING
HOW LIKELY ARE YOU TO NOD OFF OR FALL ASLEEP WHILE SITTING INACTIVE IN A PUBLIC PLACE: SLIGHT CHANCE OF DOZING
HOW LIKELY ARE YOU TO NOD OFF OR FALL ASLEEP WHILE SITTING AND TALKING TO SOMEONE: WOULD NEVER DOZE

## 2023-03-29 ENCOUNTER — OFFICE VISIT (OUTPATIENT)
Dept: SLEEP MEDICINE | Facility: CLINIC | Age: 43
End: 2023-03-29
Payer: COMMERCIAL

## 2023-03-29 VITALS
HEIGHT: 63 IN | HEART RATE: 109 BPM | OXYGEN SATURATION: 98 % | SYSTOLIC BLOOD PRESSURE: 123 MMHG | BODY MASS INDEX: 24.8 KG/M2 | DIASTOLIC BLOOD PRESSURE: 84 MMHG | WEIGHT: 140 LBS

## 2023-03-29 DIAGNOSIS — R06.83 SNORING: Primary | ICD-10-CM

## 2023-03-29 PROCEDURE — 99213 OFFICE O/P EST LOW 20 MIN: CPT | Performed by: INTERNAL MEDICINE

## 2023-03-29 NOTE — NURSING NOTE
"Chief Complaint   Patient presents with     Study Results     HST Results       Initial /84   Pulse 109   Ht 1.6 m (5' 3\")   Wt 63.5 kg (140 lb)   SpO2 98%   BMI 24.80 kg/m   Estimated body mass index is 24.8 kg/m  as calculated from the following:    Height as of this encounter: 1.6 m (5' 3\").    Weight as of this encounter: 63.5 kg (140 lb).    Medication Reconciliation: complete  ESS 12  Norma Patten MA  "

## 2023-03-29 NOTE — PROGRESS NOTES
Sleep Study Follow-Up Visit:    Date on this visit: 3/29/2023    Viv Rubio comes in today for follow-up of her home sleep study done on 1/31/23 at the [facility] for possible sleep apnea.    Analysis Time:  389.5 minutes        Respiration:   Sleep Associated Hypoxemia: sustained hypoxemia was not present. Baseline oxygen saturation was 97%.  Time with saturation less than or equal to 88% was 0 minutes. The lowest oxygen saturation was 92%.   Snoring: Snoring was present.  Respiratory events: The home study revealed a presence of 0 obstructive apneas and 0 mixed and central apneas. There were 0 hypopneas resulting in a combined apnea/hypopnea index [AHI] of 0 events per hour.  AHI was 0 per hour supine, N/A per hour prone, N/A per hour on left side, and 0 per hour on right side.   Pattern: Excluding events noted above, respiratory rate and pattern was Normal.        Position: Percent of time spent: supine - 32.7%, prone - 0%, on left - 0%, on right - 67.3%.        Heart Rate: By pulse oximetry normal rate was noted.         Assessment:     Negative for obstructive sleep apnea.    Sleep associated hypoxemia was not present.    These findings were reviewed with patient.     Past medical/surgical history, family history, social history, medications and allergies were reviewed.      Problem List:  Patient Active Problem List    Diagnosis Date Noted     Displacement of lumbar intervertebral disc without myelopathy 10/14/2008     Priority: Medium     Nonallopathic lesion of lumbar region 10/14/2008     Priority: Medium     Problem list name updated by automated process. Provider to review          Impression/Plan:    1.  Negative home sleep apnea test  2.  Snoring    Home sleep test result was reviewed in detail.  Overall, we had a good test with more than 2 hours in supine position.  There is no evidence of sleep apnea during this test.  We discussed limitations of home sleep testing and low  specificity in case of a nondiagnostic test.  However, my assessment is that significant sleep apnea is unlikely.  In lab PSG was offered, but considering negative home sleep test, patient declines any further testing.    Her main concern is socially disruptive snoring that disrupts her 's sleep.  We discussed management options for snoring.  She will try a steroid nasal spray.  I also provided referral to dental sleep medicine for a dental appliance to treat snoring.    I spent a total of 15 minutes for this appointment on this date of service which include time spent before, during and after the visit for chart review, patient care, counseling and coordination of care.    Dr. Issac Alejandro    CC: Alexis Contreras

## 2023-07-05 ENCOUNTER — TRANSFERRED RECORDS (OUTPATIENT)
Dept: HEALTH INFORMATION MANAGEMENT | Facility: CLINIC | Age: 43
End: 2023-07-05
Payer: COMMERCIAL

## 2023-07-10 ENCOUNTER — PATIENT OUTREACH (OUTPATIENT)
Dept: CARE COORDINATION | Facility: CLINIC | Age: 43
End: 2023-07-10
Payer: COMMERCIAL

## 2023-07-24 ENCOUNTER — PATIENT OUTREACH (OUTPATIENT)
Dept: CARE COORDINATION | Facility: CLINIC | Age: 43
End: 2023-07-24
Payer: COMMERCIAL

## 2023-09-23 ENCOUNTER — HEALTH MAINTENANCE LETTER (OUTPATIENT)
Age: 43
End: 2023-09-23

## 2024-01-31 ENCOUNTER — HOSPITAL ENCOUNTER (OUTPATIENT)
Dept: ULTRASOUND IMAGING | Facility: CLINIC | Age: 44
Discharge: HOME | End: 2024-01-31
Attending: OTOLARYNGOLOGY | Admitting: OTOLARYNGOLOGY
Payer: COMMERCIAL

## 2024-01-31 DIAGNOSIS — D44.0 FOLLICULAR TUMOR OF UNCERTAIN BEHAVIOR OF THYROID GLAND: ICD-10-CM

## 2024-01-31 DIAGNOSIS — E04.1 THYROID NODULE: ICD-10-CM

## 2024-01-31 PROCEDURE — 76536 US EXAM OF HEAD AND NECK: CPT

## 2024-02-06 ENCOUNTER — CARE COORDINATION (OUTPATIENT)
Dept: SLEEP MEDICINE | Facility: CLINIC | Age: 44
End: 2024-02-06
Payer: COMMERCIAL

## 2024-02-06 NOTE — PROGRESS NOTES
Mook from Dr Whyte office is calling to see if the sleep study done on 1/30/24 could be re read. She is not agreeing with the events as zero. Mook number is [phone]. Please advise.

## 2024-02-08 NOTE — TELEPHONE ENCOUNTER
Called Mook back she was not available. I read the last note on the answering machine and asked them to call back if any other concerns.

## 2024-02-15 ENCOUNTER — HOSPITAL ENCOUNTER (OUTPATIENT)
Dept: ULTRASOUND IMAGING | Facility: CLINIC | Age: 44
Discharge: HOME | End: 2024-02-15
Attending: STUDENT IN AN ORGANIZED HEALTH CARE EDUCATION/TRAINING PROGRAM | Admitting: STUDENT IN AN ORGANIZED HEALTH CARE EDUCATION/TRAINING PROGRAM
Payer: COMMERCIAL

## 2024-02-15 DIAGNOSIS — D44.0 NEOPLASM OF UNCERTAIN BEHAVIOR OF THYROID GLAND: Primary | ICD-10-CM

## 2024-02-15 PROCEDURE — 10005 FNA BX W/US GDN 1ST LES: CPT

## 2024-02-15 PROCEDURE — 88173 CYTOPATH EVAL FNA REPORT: CPT | Mod: TC

## 2024-02-15 PROCEDURE — 88173 CYTOPATH EVAL FNA REPORT: CPT | Mod: 26 | Performed by: PATHOLOGY

## 2024-02-15 PROCEDURE — 88172 CYTP DX EVAL FNA 1ST EA SITE: CPT | Mod: 26 | Performed by: PATHOLOGY

## 2024-02-19 LAB
PATH REPORT.COMMENTS IMP SPEC: ABNORMAL
PATH REPORT.COMMENTS IMP SPEC: YES
PATH REPORT.FINAL DX SPEC: ABNORMAL
PATH REPORT.GROSS SPEC: ABNORMAL
PATH REPORT.MICROSCOPIC SPEC OTHER STN: ABNORMAL
PATH REPORT.RELEVANT HX SPEC: ABNORMAL

## 2024-03-04 LAB — SCANNED LAB RESULT: NORMAL

## 2024-11-10 ENCOUNTER — HEALTH MAINTENANCE LETTER (OUTPATIENT)
Age: 44
End: 2024-11-10

## 2024-12-02 ENCOUNTER — ANCILLARY PROCEDURE (OUTPATIENT)
Dept: GENERAL RADIOLOGY | Facility: CLINIC | Age: 44
End: 2024-12-02
Attending: PHYSICIAN ASSISTANT
Payer: COMMERCIAL

## 2024-12-02 ENCOUNTER — OFFICE VISIT (OUTPATIENT)
Dept: URGENT CARE | Facility: URGENT CARE | Age: 44
End: 2024-12-02
Payer: COMMERCIAL

## 2024-12-02 VITALS
RESPIRATION RATE: 14 BRPM | OXYGEN SATURATION: 98 % | TEMPERATURE: 98.7 F | HEART RATE: 93 BPM | SYSTOLIC BLOOD PRESSURE: 120 MMHG | DIASTOLIC BLOOD PRESSURE: 85 MMHG

## 2024-12-02 DIAGNOSIS — J22 LRTI (LOWER RESPIRATORY TRACT INFECTION): ICD-10-CM

## 2024-12-02 DIAGNOSIS — R05.1 ACUTE COUGH: ICD-10-CM

## 2024-12-02 DIAGNOSIS — R05.1 ACUTE COUGH: Primary | ICD-10-CM

## 2024-12-02 PROCEDURE — 71046 X-RAY EXAM CHEST 2 VIEWS: CPT | Mod: TC | Performed by: RADIOLOGY

## 2024-12-02 PROCEDURE — 99214 OFFICE O/P EST MOD 30 MIN: CPT | Performed by: PHYSICIAN ASSISTANT

## 2024-12-02 RX ORDER — CHOLECALCIFEROL (VITAMIN D3) 50 MCG
2000 TABLET ORAL
COMMUNITY

## 2024-12-02 RX ORDER — SPIRONOLACTONE 100 MG/1
TABLET, FILM COATED ORAL
COMMUNITY

## 2024-12-02 RX ORDER — DOXYCYCLINE 100 MG/1
100 CAPSULE ORAL 2 TIMES DAILY
Qty: 14 CAPSULE | Refills: 0 | Status: SHIPPED | OUTPATIENT
Start: 2024-12-02 | End: 2024-12-09

## 2024-12-02 RX ORDER — LEVONORGESTREL/ETHIN.ESTRADIOL 0.1-0.02MG
TABLET ORAL
COMMUNITY

## 2024-12-02 NOTE — PROGRESS NOTES
Chief Complaint   Patient presents with    Cough     Started with cough  3 days ago worse yesterday low grade temp her children had pneumonia  also has headache nausea hard to take deep breath       ASSESSMENT/PLAN:  Viv was seen today for cough.    Diagnoses and all orders for this visit:    Acute cough  -     XR Chest 2 Views; Future    LRTI (lower respiratory tract infection)  -     doxycycline hyclate (VIBRAMYCIN) 100 MG capsule; Take 1 capsule (100 mg) by mouth 2 times daily for 7 days.    X-ray and lung exam clear today but with the exposures of pneumonia in the home that would be the main concern.  This could be early pneumonia versus viral infection.  Discussed this with patient.  Will prescribe antibiotic but do think it is also reasonable to watch and wait.  If symptoms are not improving within 3 to 4 days or she experience worsening symptoms would recommend starting the antibiotic    Cash Iglesias PA-C      SUBJECTIVE:  Viv is a 43 year old female who presents to urgent care with 3 days of progressively worsening cough.  Headache, nausea, fatigue, malaise.  Feels feverish.  Chest feels tight and short of breath.  No chest pain.  Children were diagnosed with pneumonia.    ROS: Pertinent ROS neg other than the symptoms noted above in the HPI.     OBJECTIVE:  /85   Pulse 93   Temp 98.7  F (37.1  C) (Oral)   Resp 14   SpO2 98%    GENERAL: alert and no distress  EYES: Eyes grossly normal to inspection, PERRL and conjunctivae and sclerae normal  HENT: nose and mouth without ulcers or lesions  RESP: lungs clear to auscultation - no rales, rhonchi or wheezes, frequent cough  CV: regular rate and rhythm, normal S1 S2, no S3 or S4, no murmur, click or rub, no peripheral edema     DIAGNOSTICS  Xray - Reviewed and interpreted by me.  No acute cardiopulmonary abnormality noted  No results found for any visits on 12/02/24.     Current Outpatient Medications   Medication Sig Dispense Refill     levonorgestrel-ethinyl estradiol (AVIANE) 0.1-20 MG-MCG tablet       metroNIDAZOLE (METROGEL) 0.75 % external gel Apply topically 2 times daily      spironolactone (ALDACTONE) 100 MG tablet take 1 tablet by mouth every day for 90 days      Vitamin D3 50 mcg (2000 units) tablet Take 2,000 Units by mouth.       No current facility-administered medications for this visit.      Patient Active Problem List   Diagnosis    Displacement of lumbar intervertebral disc without myelopathy    Nonallopathic lesion of lumbar region      Past Medical History:   Diagnosis Date    Displacement of lumbar intervertebral disc without myelopathy 10/14/2008    Nonallopathic lesion of lumbar region, not elsewhere classified 10/14/2008    Seizures (H)     medically cleared since age 24    Thyroid nodule 10/12/2017     Past Surgical History:   Procedure Laterality Date    CHOLECYSTECTOMY  2011    [facility] REMOVAL GALLBLADDER      Description: Cholecystectomy;  Recorded: 10/08/2014;    US THYROID BIOPSY  12/3/2018     Family History   Problem Relation Age of Onset    Multiple Sclerosis Other         family history of     Diabetes Other     Hypertension Other     Multiple Sclerosis Mother      Social History     Tobacco Use    Smoking status: Never    Smokeless tobacco: Never   Substance Use Topics    Alcohol use: No              The plan of care was discussed with the patient. They understand and agree with the course of treatment prescribed. A printed summary was given including instructions and medications.  The use of Dragon/[employer] dictation services may have been used to construct the content in this note; any grammatical or spelling errors are non-intentional. Please contact the author of this note directly if you are in need of any clarification.

## 2025-01-04 ENCOUNTER — HEALTH MAINTENANCE LETTER (OUTPATIENT)
Age: 45
End: 2025-01-04

## 2025-02-01 ENCOUNTER — OFFICE VISIT (OUTPATIENT)
Dept: URGENT CARE | Facility: URGENT CARE | Age: 45
End: 2025-02-01
Payer: COMMERCIAL

## 2025-02-01 VITALS
DIASTOLIC BLOOD PRESSURE: 86 MMHG | TEMPERATURE: 97.4 F | OXYGEN SATURATION: 97 % | BODY MASS INDEX: 25.15 KG/M2 | WEIGHT: 142 LBS | RESPIRATION RATE: 16 BRPM | HEART RATE: 85 BPM | SYSTOLIC BLOOD PRESSURE: 126 MMHG

## 2025-02-01 DIAGNOSIS — N30.01 ACUTE CYSTITIS WITH HEMATURIA: Primary | ICD-10-CM

## 2025-02-01 LAB
ALBUMIN UR-MCNC: NEGATIVE MG/DL
APPEARANCE UR: CLEAR
BACTERIA #/AREA URNS HPF: ABNORMAL /HPF
BILIRUB UR QL STRIP: NEGATIVE
COLOR UR AUTO: YELLOW
GLUCOSE UR STRIP-MCNC: 100 MG/DL
HGB UR QL STRIP: ABNORMAL
KETONES UR STRIP-MCNC: NEGATIVE MG/DL
LEUKOCYTE ESTERASE UR QL STRIP: ABNORMAL
NITRATE UR QL: POSITIVE
PH UR STRIP: 5.5 [PH] (ref 5–8)
RBC #/AREA URNS AUTO: ABNORMAL /HPF
SP GR UR STRIP: <=1.005 (ref 1–1.03)
SQUAMOUS #/AREA URNS AUTO: ABNORMAL /LPF
UROBILINOGEN UR STRIP-ACNC: 0.2 E.U./DL
WBC #/AREA URNS AUTO: ABNORMAL /HPF

## 2025-02-01 PROCEDURE — 87186 SC STD MICRODIL/AGAR DIL: CPT | Performed by: PHYSICIAN ASSISTANT

## 2025-02-01 PROCEDURE — 81001 URINALYSIS AUTO W/SCOPE: CPT | Performed by: PHYSICIAN ASSISTANT

## 2025-02-01 PROCEDURE — 99213 OFFICE O/P EST LOW 20 MIN: CPT | Performed by: PHYSICIAN ASSISTANT

## 2025-02-01 PROCEDURE — 87088 URINE BACTERIA CULTURE: CPT | Performed by: PHYSICIAN ASSISTANT

## 2025-02-01 PROCEDURE — 87086 URINE CULTURE/COLONY COUNT: CPT | Performed by: PHYSICIAN ASSISTANT

## 2025-02-01 RX ORDER — SULFAMETHOXAZOLE AND TRIMETHOPRIM 800; 160 MG/1; MG/1
1 TABLET ORAL 2 TIMES DAILY
Qty: 10 TABLET | Refills: 0 | Status: SHIPPED | OUTPATIENT
Start: 2025-02-01 | End: 2025-02-06

## 2025-02-01 NOTE — PROGRESS NOTES
Assessment & Plan:      Problem List Items Addressed This Visit    None  Visit Diagnoses       Acute cystitis with hematuria    -  Primary    Relevant Medications    sulfamethoxazole-trimethoprim (BACTRIM DS) 800-160 MG tablet    Other Relevant Orders    UA Macroscopic with reflex to Microscopic and Culture - Clinic Collect (Completed)    Urine Microscopic Exam (Completed)    Urine Culture          Medical Decision Making  Patient with history of urinary tract infections resistant to Macrobid presents with ongoing urinary symptoms for 5 to 6 days despite 3-day treatment of Macrobid.  Urinalysis today is consistent with acute cystitis.  Low concern for pyelonephritis given no CVA tenderness and no significant fevers.  Will start patient on oral Bactrim.  Discussed treatment and symptomatic care.  Allergies and medication interactions reviewed.  Discussed signs of worsening symptoms and when to follow-up with PCP if no symptom improvement.     Subjective:      Viv Rubio is a 44 year old female here for evaluation of dysuria and increased urinary frequency.  Onset of symptoms was 5 to 6 days ago.  Patient was seen in urgent care 3 days ago and was prescribed Macrobid for urinary tract infection.  Patient tried to inform the provider at that time that previous urinary tract infections have been resistant to the Macrobid and she normally does not improve on this antibiotic.  Patient was given the Macrobid anyways and only for 3 days.  Patient has had no improvement of symptoms since then.  Patient is slightly nauseous but otherwise denies current fevers and new back pains.     The following portions of the patient's history were reviewed and updated as appropriate: allergies, current medications, and problem list.     Review of Systems  Pertinent items are noted in HPI.    Allergies  Allergies   Allergen Reactions    Adhesive Tape Rash    Tuberculin Purified Protein Derivative Rash       Family History   Problem  Relation Age of Onset    Multiple Sclerosis Other         family history of     Diabetes Other     Hypertension Other     Multiple Sclerosis Mother        Social History     Tobacco Use    Smoking status: Never    Smokeless tobacco: Never   Substance Use Topics    Alcohol use: No        Objective:      /86 (BP Location: Right arm, Patient Position: Sitting, Cuff Size: Adult Regular)   Pulse 85   Temp 97.4  F (36.3  C) (Oral)   Resp 16   Wt 64.4 kg (142 lb)   SpO2 97%   BMI 25.15 kg/m    General appearance - alert, well appearing, and in no distress and non-toxic  Back exam - no CVA tenderness    The use of Dragon/[employer] dictation services was used to construct the content of this note; any grammatical errors are non-intentional. Please contact the author directly if you are in need of any clarification.

## 2025-02-03 LAB — BACTERIA UR CULT: ABNORMAL

## 2025-02-26 ENCOUNTER — OFFICE VISIT (OUTPATIENT)
Dept: URGENT CARE | Facility: URGENT CARE | Age: 45
End: 2025-02-26
Payer: COMMERCIAL

## 2025-02-26 VITALS
TEMPERATURE: 98.3 F | OXYGEN SATURATION: 97 % | RESPIRATION RATE: 16 BRPM | SYSTOLIC BLOOD PRESSURE: 130 MMHG | WEIGHT: 142 LBS | DIASTOLIC BLOOD PRESSURE: 84 MMHG | BODY MASS INDEX: 25.15 KG/M2 | HEART RATE: 89 BPM

## 2025-02-26 DIAGNOSIS — J02.9 SORE THROAT: Primary | ICD-10-CM

## 2025-02-26 LAB
DEPRECATED S PYO AG THROAT QL EIA: NEGATIVE
S PYO DNA THROAT QL NAA+PROBE: NOT DETECTED

## 2025-02-26 PROCEDURE — 87651 STREP A DNA AMP PROBE: CPT | Performed by: FAMILY MEDICINE

## 2025-02-26 ASSESSMENT — ENCOUNTER SYMPTOMS: SORE THROAT: 1

## 2025-02-26 NOTE — PATIENT INSTRUCTIONS
Supportive care:    Continue to rest, get plenty of fluids and watch for any change or new symptoms. If you develop new fevers, worsening symptoms or any new concerning symptoms- return for reevaluation. Most viral illnesses  take 7-10 days to fully resolve. The key is often watchful waiting to see if you are improving.    If you have a fever: You may take fever reducers such as Tylenol or ibuprofen. Children under 6-month-old should not be given ibuprofen.     For cough & congestion:    You may also try a humidifier, Vicks Vapo-rub, Benadryl at night or Claritin / Zyrtec/ Allegra during the daytime.  If you have sinus pressure or nasal congestion you can get nasal saline spray or steroid nasal sprays like Flonase or nasonex can help. Nasal steroids take up to 10-14 days to see effects, so don't give up!  Some people find relief with use of a Gilchrist pot, however if you choose to use a Eula pot you should only use it with distilled water.    Honey is good for nighttime cough. Honey should not be given to children under 1 year of age.    For sore throat:  Throat lozenges can help sore throat, so can tea or teaspoon of honey. Jimena is also helpful in tea.  Gargling with warm salt water can be soothing to inflamed throat tissue.    OF NOTE:  If you have had a history of acid reflux or a GI bleed or gastric bypass or if any other reason you have been told you should not take Tylenol or Ibuprofen, do not use these medications. If you have had an allergic reaction to any of these medications do not take them.

## 2025-02-27 NOTE — PROGRESS NOTES
Patient presents with:  Throat Problem: Has had sore throat  for several days fever today  had strep exposure      Assessment/MDM:    Viv Rubio is a 44 year old female is here today for exposed to strep . Strep is negative here. Supportive care           HPI:  Pharyngitis   This is a new problem. The current episode started 2 days ago. The problem has not changed since onset.There has been no fever. She has had exposure to strep.        Review of Systems   HENT:  Positive for sore throat.    All other systems reviewed and are negative.      Vitals:    02/26/25 1705   BP: 130/84   Pulse: 89   Resp: 16   Temp: 98.3  F (36.8  C)   TempSrc: Oral   SpO2: 97%   Weight: 64.4 kg (142 lb)       Physical Exam  Vitals and nursing note reviewed.   HENT:      Nose: Nose normal.      Mouth/Throat:      Mouth: Mucous membranes are moist.   Cardiovascular:      Rate and Rhythm: Normal rate.   Pulmonary:      Effort: Pulmonary effort is normal.   Neurological:      Mental Status: She is alert.         Results:  Results for orders placed or performed in visit on 02/26/25   Streptococcus A Rapid Screen w/Reflex to PCR - Clinic Collect     Status: Normal    Specimen: Throat; Swab   Result Value Ref Range    Group A Strep antigen Negative Negative           Past Medical History: has been reviewed by me. I have also reviewed past visits, lab results and studies  Adverse Drug Reactions: Adhesive tape and Tuberculin purified protein derivative    Medications: reviewed by me today    Family History: Reviewed by me today  Social History:   Social History     Tobacco Use    Smoking status: Never    Smokeless tobacco: Never   Substance Use Topics    Alcohol use: No       Tobacco:   History   Smoking Status    Never   Smokeless Tobacco    Never         I have reviewed and recommended any over-the-counter medications that will aid in the symptomatic relief of this illness.    The risk of complications, morbidity, and/or mortality of  patient management decisions were made during the visit with the patient. These may be associated with the patient s problems, the diagnostic procedures, or the treatment. This includes possible management options selected, as well options considered but ultimately not selected, after shared medical decision making with the patient and/or family.        ICD-10-CM    1. Sore throat  J02.9 Streptococcus A Rapid Screen w/Reflex to PCR - Clinic Collect     Group A Streptococcus PCR Throat Swab           Jolly Anderson MD  2/26/2025, 7:08 PM.      Patient Instructions   Supportive care:    Continue to rest, get plenty of fluids and watch for any change or new symptoms. If you develop new fevers, worsening symptoms or any new concerning symptoms- return for reevaluation. Most viral illnesses  take 7-10 days to fully resolve. The key is often watchful waiting to see if you are improving.    If you have a fever: You may take fever reducers such as Tylenol or ibuprofen. Children under 6-month-old should not be given ibuprofen.     For cough & congestion:    You may also try a humidifier, Vicks Vapo-rub, Benadryl at night or Claritin / Zyrtec/ Allegra during the daytime.  If you have sinus pressure or nasal congestion you can get nasal saline spray or steroid nasal sprays like Flonase or nasonex can help. Nasal steroids take up to 10-14 days to see effects, so don't give up!  Some people find relief with use of a Eula pot, however if you choose to use a Eula pot you should only use it with distilled water.    Honey is good for nighttime cough. Honey should not be given to children under 1 year of age.    For sore throat:  Throat lozenges can help sore throat, so can tea or teaspoon of honey. Jimena is also helpful in tea.  Gargling with warm salt water can be soothing to inflamed throat tissue.    OF NOTE:  If you have had a history of acid reflux or a GI bleed or gastric bypass or if any other reason you have been told  you should not take Tylenol or Ibuprofen, do not use these medications. If you have had an allergic reaction to any of these medications do not take them.

## 2025-07-06 ENCOUNTER — OFFICE VISIT (OUTPATIENT)
Dept: URGENT CARE | Facility: URGENT CARE | Age: 45
End: 2025-07-06
Payer: COMMERCIAL

## 2025-07-06 VITALS
HEART RATE: 78 BPM | TEMPERATURE: 98.1 F | WEIGHT: 140 LBS | RESPIRATION RATE: 16 BRPM | DIASTOLIC BLOOD PRESSURE: 81 MMHG | BODY MASS INDEX: 24.8 KG/M2 | SYSTOLIC BLOOD PRESSURE: 114 MMHG | OXYGEN SATURATION: 98 % | HEIGHT: 63 IN

## 2025-07-06 DIAGNOSIS — R30.0 DYSURIA: ICD-10-CM

## 2025-07-06 DIAGNOSIS — N30.00 ACUTE CYSTITIS WITHOUT HEMATURIA: Primary | ICD-10-CM

## 2025-07-06 LAB
ALBUMIN UR-MCNC: NEGATIVE MG/DL
APPEARANCE UR: CLEAR
BACTERIA #/AREA URNS HPF: ABNORMAL /HPF
BILIRUB UR QL STRIP: NEGATIVE
COLOR UR AUTO: YELLOW
GLUCOSE UR STRIP-MCNC: NEGATIVE MG/DL
HGB UR QL STRIP: ABNORMAL
KETONES UR STRIP-MCNC: NEGATIVE MG/DL
LEUKOCYTE ESTERASE UR QL STRIP: ABNORMAL
MUCOUS THREADS #/AREA URNS LPF: PRESENT /LPF
NITRATE UR QL: NEGATIVE
PH UR STRIP: 7.5 [PH] (ref 5–8)
RBC #/AREA URNS AUTO: ABNORMAL /HPF
SP GR UR STRIP: 1.01 (ref 1–1.03)
SQUAMOUS #/AREA URNS AUTO: ABNORMAL /LPF
UROBILINOGEN UR STRIP-ACNC: 0.2 E.U./DL
WBC #/AREA URNS AUTO: ABNORMAL /HPF
WBC CLUMPS #/AREA URNS HPF: PRESENT /HPF

## 2025-07-06 PROCEDURE — 87088 URINE BACTERIA CULTURE: CPT | Performed by: PHYSICIAN ASSISTANT

## 2025-07-06 PROCEDURE — 87186 SC STD MICRODIL/AGAR DIL: CPT | Performed by: PHYSICIAN ASSISTANT

## 2025-07-06 PROCEDURE — 87086 URINE CULTURE/COLONY COUNT: CPT | Performed by: PHYSICIAN ASSISTANT

## 2025-07-06 PROCEDURE — 81001 URINALYSIS AUTO W/SCOPE: CPT | Performed by: PHYSICIAN ASSISTANT

## 2025-07-06 RX ORDER — ONDANSETRON 4 MG/1
4 TABLET, ORALLY DISINTEGRATING ORAL 3 TIMES DAILY PRN
Qty: 15 TABLET | Refills: 0 | Status: SHIPPED | OUTPATIENT
Start: 2025-07-06 | End: 2025-07-11

## 2025-07-06 RX ORDER — CEFDINIR 300 MG/1
300 CAPSULE ORAL 2 TIMES DAILY
Qty: 14 CAPSULE | Refills: 0 | Status: SHIPPED | OUTPATIENT
Start: 2025-07-06 | End: 2025-07-13

## 2025-07-06 NOTE — PROGRESS NOTES
Urgent Care Clinic Visit  {Rapid Rooming (Optional):500221}  Chief Complaint   Patient presents with    UTI     Started yesterday with headache  urine has strong odor and some frequency           {(!) Visit Details have not yet been documented.  Please enter Visit Details and then use this list to pull in documentation. (Optional):[id number]}  {MA/LPN/RN Pre-Provider Visit Orders- hCG/UA/Strep/Influenza/Vaginal Infection (Optional):114029}

## 2025-07-06 NOTE — PROGRESS NOTES
"Patient presents with:  UTI: Started yesterday with headache  urine has strong odor and some frequency      Clinical Decision Making:      ICD-10-CM    1. Dysuria  R30.0 UA Macroscopic with reflex to Microscopic and Culture - Clinic Collect          There are no Patient Instructions on file for this visit.    HPI:  Viv Rubio is a 44 year old female who presents today complaining of ***    History obtained from {source of history:808278}.    Problem List:  2008-10: Displacement of lumbar intervertebral disc without myelopathy  2008-10: Nonallopathic lesion of lumbar region      Past Medical History:   Diagnosis Date    Displacement of lumbar intervertebral disc without myelopathy 10/14/2008    Nonallopathic lesion of lumbar region, not elsewhere classified 10/14/2008    Seizures (H)     medically cleared since age 24    Thyroid nodule 10/12/2017       Social History     Tobacco Use    Smoking status: Never    Smokeless tobacco: Never   Substance Use Topics    Alcohol use: No       Review of Systems  As above in HPI otherwise negative.    Vitals:    07/06/25 0934   BP: 114/81   Pulse: 78   Resp: 16   Temp: 98.1  F (36.7  C)   TempSrc: Oral   SpO2: 98%   Weight: 63.5 kg (140 lb)   Height: 1.6 m (5' 3\")       General: Patient is resting comfortably no acute distress is afebrile  HEENT: Head is normocephalic atraumatic   eyes are PERRL EOMI sclera anicteric   TMs are clear bilaterally  Throat is with mild pharyngeal wall erythema and no exudate  No cervical lymphadenopathy present  LUNGS: Clear to auscultation bilaterally  HEART: Regular rate and rhythm  Skin: Without rash non-diaphoretic    Physical Exam      Labs:  No results found for any visits on 07/06/25.    Radiology:  I have personally ordered and preliminarily reviewed the following xray, I have noted ***    No results found.     At the end of the encounter, I discussed results, diagnosis, medications. Discussed red flags for immediate return to clinic/ER, " as well as indications for follow up if no improvement. Patient understood and agreed to plan. Patient was stable for discharge.

## 2025-07-09 LAB
BACTERIA UR CULT: ABNORMAL
BACTERIA UR CULT: ABNORMAL